# Patient Record
Sex: MALE | Race: WHITE | NOT HISPANIC OR LATINO | URBAN - METROPOLITAN AREA
[De-identification: names, ages, dates, MRNs, and addresses within clinical notes are randomized per-mention and may not be internally consistent; named-entity substitution may affect disease eponyms.]

---

## 2017-04-03 ENCOUNTER — OUTPATIENT (OUTPATIENT)
Dept: OUTPATIENT SERVICES | Age: 22
LOS: 1 days | Discharge: HOME | End: 2017-04-03

## 2017-04-04 ENCOUNTER — APPOINTMENT (OUTPATIENT)
Dept: PEDIATRIC CARDIOLOGY | Facility: CLINIC | Age: 22
End: 2017-04-04

## 2017-04-25 ENCOUNTER — APPOINTMENT (OUTPATIENT)
Dept: PEDIATRIC CARDIOLOGY | Facility: CLINIC | Age: 22
End: 2017-04-25

## 2017-04-25 VITALS
WEIGHT: 127.87 LBS | HEIGHT: 64.96 IN | HEART RATE: 62 BPM | BODY MASS INDEX: 21.3 KG/M2 | SYSTOLIC BLOOD PRESSURE: 117 MMHG | OXYGEN SATURATION: 100 % | DIASTOLIC BLOOD PRESSURE: 69 MMHG

## 2018-04-09 ENCOUNTER — OUTPATIENT (OUTPATIENT)
Dept: OUTPATIENT SERVICES | Age: 23
LOS: 1 days | Discharge: HOME | End: 2018-04-09

## 2018-04-10 ENCOUNTER — APPOINTMENT (OUTPATIENT)
Dept: PEDIATRIC CARDIOLOGY | Facility: CLINIC | Age: 23
End: 2018-04-10
Payer: COMMERCIAL

## 2018-04-10 VITALS
HEART RATE: 68 BPM | SYSTOLIC BLOOD PRESSURE: 125 MMHG | HEIGHT: 64.76 IN | DIASTOLIC BLOOD PRESSURE: 71 MMHG | BODY MASS INDEX: 21.75 KG/M2 | WEIGHT: 128.97 LBS | OXYGEN SATURATION: 100 %

## 2018-04-10 PROCEDURE — 93325 DOPPLER ECHO COLOR FLOW MAPG: CPT

## 2018-04-10 PROCEDURE — 93303 ECHO TRANSTHORACIC: CPT

## 2018-04-10 PROCEDURE — 93000 ELECTROCARDIOGRAM COMPLETE: CPT

## 2018-04-10 PROCEDURE — 99214 OFFICE O/P EST MOD 30 MIN: CPT | Mod: 25

## 2018-04-10 PROCEDURE — 93320 DOPPLER ECHO COMPLETE: CPT

## 2018-04-10 RX ORDER — ESCITALOPRAM OXALATE 10 MG/1
10 TABLET ORAL
Qty: 30 | Refills: 0 | Status: DISCONTINUED | COMMUNITY
Start: 2018-03-26

## 2018-04-11 ENCOUNTER — MEDICATION RENEWAL (OUTPATIENT)
Age: 23
End: 2018-04-11

## 2018-12-03 ENCOUNTER — APPOINTMENT (OUTPATIENT)
Dept: PEDIATRIC CARDIOLOGY | Facility: CLINIC | Age: 23
End: 2018-12-03

## 2018-12-04 ENCOUNTER — RESULT CHARGE (OUTPATIENT)
Age: 23
End: 2018-12-04

## 2018-12-06 ENCOUNTER — OTHER (OUTPATIENT)
Age: 23
End: 2018-12-06

## 2018-12-06 ENCOUNTER — APPOINTMENT (OUTPATIENT)
Dept: PULMONOLOGY | Facility: CLINIC | Age: 23
End: 2018-12-06
Payer: COMMERCIAL

## 2018-12-06 ENCOUNTER — APPOINTMENT (OUTPATIENT)
Dept: PEDIATRIC CARDIOLOGY | Facility: CLINIC | Age: 23
End: 2018-12-06
Payer: COMMERCIAL

## 2018-12-06 ENCOUNTER — APPOINTMENT (OUTPATIENT)
Dept: PEDIATRIC CARDIOLOGY | Facility: CLINIC | Age: 23
End: 2018-12-06

## 2018-12-06 VITALS
HEART RATE: 51 BPM | TEMPERATURE: 98.4 F | OXYGEN SATURATION: 95 % | WEIGHT: 128 LBS | HEIGHT: 64 IN | BODY MASS INDEX: 21.85 KG/M2

## 2018-12-06 VITALS
TEMPERATURE: 97.4 F | WEIGHT: 128 LBS | BODY MASS INDEX: 21.85 KG/M2 | OXYGEN SATURATION: 100 % | HEIGHT: 64 IN | SYSTOLIC BLOOD PRESSURE: 131 MMHG | DIASTOLIC BLOOD PRESSURE: 68 MMHG | HEART RATE: 59 BPM

## 2018-12-06 DIAGNOSIS — Z86.59 PERSONAL HISTORY OF OTHER MENTAL AND BEHAVIORAL DISORDERS: ICD-10-CM

## 2018-12-06 DIAGNOSIS — Q67.7 PECTUS CARINATUM: ICD-10-CM

## 2018-12-06 DIAGNOSIS — R07.89 OTHER CHEST PAIN: ICD-10-CM

## 2018-12-06 DIAGNOSIS — Z51.81 ENCOUNTER FOR THERAPEUTIC DRUG LVL MONITORING: ICD-10-CM

## 2018-12-06 DIAGNOSIS — Z79.899 ENCOUNTER FOR THERAPEUTIC DRUG LVL MONITORING: ICD-10-CM

## 2018-12-06 DIAGNOSIS — R06.09 OTHER FORMS OF DYSPNEA: ICD-10-CM

## 2018-12-06 DIAGNOSIS — Q87.89 OTHER SPECIFIED CONGENITAL MALFORMATION SYNDROMES, NOT ELSEWHERE CLASSIFIED: ICD-10-CM

## 2018-12-06 DIAGNOSIS — Z87.898 PERSONAL HISTORY OF OTHER SPECIFIED CONDITIONS: ICD-10-CM

## 2018-12-06 PROCEDURE — 93000 ELECTROCARDIOGRAM COMPLETE: CPT

## 2018-12-06 PROCEDURE — 99205 OFFICE O/P NEW HI 60 MIN: CPT | Mod: 25

## 2018-12-06 PROCEDURE — 99204 OFFICE O/P NEW MOD 45 MIN: CPT

## 2018-12-06 RX ORDER — ESCITALOPRAM OXALATE 10 MG/1
10 TABLET, FILM COATED ORAL
Refills: 0 | Status: DISCONTINUED | COMMUNITY
Start: 2018-04-10 | End: 2018-12-06

## 2018-12-06 RX ORDER — ATOMOXETINE HYDROCHLORIDE 60 MG/1
60 CAPSULE ORAL
Refills: 0 | Status: DISCONTINUED | COMMUNITY
End: 2018-12-06

## 2018-12-06 NOTE — PHYSICAL EXAM
[General Appearance - Alert] : alert [General Appearance - In No Acute Distress] : in no acute distress [General Appearance - Well Nourished] : well nourished [General Appearance - Well Developed] : well developed [General Appearance - Well-Appearing] : well appearing [Other ___] : [unfilled] [Sclera] : the conjunctiva were normal [Examination Of The Oral Cavity] : mucous membranes were moist and pink [Auscultation Breath Sounds / Voice Sounds] : breath sounds clear to auscultation bilaterally [Pectus Excavatum] : a pectus excavatum was noted [Heart Rate And Rhythm] : normal heart rate and rhythm [Heart Sounds] : normal S1 and S2 [Hyperdynamic] : There was a hyperdynamic precordium [2+] : left 2+ [Systolic] : systolic [III] : a grade 3/6   [Apical] : apex [Holosystolic] : holosystolic [Base] : the murmur was transmitted to the base [Abdomen Soft] : soft [Nondistended] : nondistended [Nail Clubbing] : no clubbing  or cyanosis of the fingers [Musculoskeletal Exam: Normal Movement Of All Extremities] : normal movements of all extremities [Musculoskeletal - Swelling] : no joint swelling or joint tenderness [Motor Tone] : muscle strength and tone were normal [Abnormal Walk] : normal gait [] : no rash [Skin Lesions] : no lesions [Skin Turgor] : normal turgor [Demonstrated Behavior - Infant Nonreactive To Parents] : interactive [Mood] : mood and affect were appropriate for age [Demonstrated Behavior] : normal behavior

## 2018-12-12 ENCOUNTER — OUTPATIENT (OUTPATIENT)
Dept: OUTPATIENT SERVICES | Facility: HOSPITAL | Age: 23
LOS: 1 days | End: 2018-12-12
Payer: COMMERCIAL

## 2018-12-12 ENCOUNTER — APPOINTMENT (OUTPATIENT)
Dept: SLEEP CENTER | Facility: HOME HEALTH | Age: 23
End: 2018-12-12
Payer: COMMERCIAL

## 2018-12-12 PROCEDURE — 95800 SLP STDY UNATTENDED: CPT

## 2018-12-12 PROCEDURE — 95800 SLP STDY UNATTENDED: CPT | Mod: 26

## 2018-12-20 PROBLEM — Z87.898 HISTORY OF CHRONIC FATIGUE: Status: RESOLVED | Noted: 2018-12-06 | Resolved: 2018-12-20

## 2018-12-20 PROBLEM — Z51.81 ENCOUNTER FOR MONITORING BETA BLOCKER THERAPY: Status: RESOLVED | Noted: 2018-04-04 | Resolved: 2018-12-20

## 2018-12-20 PROBLEM — R06.09 DYSPNEA ON EXERTION: Status: RESOLVED | Noted: 2018-12-06 | Resolved: 2018-12-20

## 2018-12-20 NOTE — REASON FOR VISIT
[Initial Consultation] : an initial consultation for [Hypertrophic Cardiomyopathy] : hypertrophic cardiomyopathy [Patient] : patient [Parents] : parents [FreeTextEntry3] : C

## 2018-12-20 NOTE — DISCUSSION/SUMMARY
[FreeTextEntry1] : In summary Alexsander is a 22 year old male with CFC and hypertrophic cardiomyopathy (HCM). Since this was our first meeting with him we took some time to evaluate both his and his families understanding of his heart condition and the long term prognosis of his disease. We discussed that patients with HCM are at risk for sudden cardiac death. Alexsander has a intraventricular septal thickness of 2.9 cm, a history of non sustained ventricular tachycardia and dilated right and left atria on echo. Based on these findings, he does not yet meet criteria for ICD implant but does warrant close follow up and attention to any new onset symptoms. \par \par We spent a significant amount of time discussing these findings with the family and reviewing the following plan which includes work up of his daytime fatigue to rule out a primary sleep disorder or fatigue related to beta blocker therapy.\par 1. Pulmonary evaluation w/ sleep study\par 2. Repeat exercise stress test, if test is abnormal will also repeat Holter monitor\par 3. follow up in 6 months with echo cardiogram and Holter monitor\par \par We remain available to the family to answer questions and will continue to update them with results of testing as they become available. [Needs SBE Prophylaxis] : [unfilled] does not need bacterial endocarditis prophylaxis

## 2018-12-20 NOTE — REVIEW OF SYSTEMS
[Exercise Intolerance] : persistence of exercise intolerance [Shortness Of Breath] : expressed as feeling short of breath [Feeling Poorly] : not feeling poorly (malaise) [Fever] : no fever [Wgt Loss (___ Lbs)] : no recent weight loss [Pallor] : not pale [Nasal Stuffiness] : no nasal congestion [Sore Throat] : no sore throat [Cyanosis] : no cyanosis [Edema] : no edema [Diaphoresis] : not diaphoretic [Chest Pain] : no chest pain or discomfort [Palpitations] : no palpitations [Orthopnea] : no orthopnea [Fast HR] : no tachycardia [Tachypnea] : not tachypneic [Wheezing] : no wheezing [Cough] : no cough [Vomiting] : no vomiting [Diarrhea] : no diarrhea [Abdominal Pain] : no abdominal pain [Decrease In Appetite] : appetite not decreased [Fainting (Syncope)] : no fainting [Seizure] : no seizures [Headache] : no headache [Dizziness] : no dizziness [Limping] : no limping [Joint Pains] : no arthralgias [Rash] : no rash [Easy Bruising] : no tendency for easy bruising [Swollen Glands] : no lymphadenopathy [Sleep Disturbances] : ~T no sleep disturbances [Depression] : no depression [Anxiety] : no anxiety [Heat/Cold Intolerance] : no temperature intolerance

## 2018-12-20 NOTE — HISTORY OF PRESENT ILLNESS
[FreeTextEntry1] : We had the pleasure of seeing CARMEN JAMESON for evaluation today in the Adult Cong[facility] Program. This is his first visit here with us today. He has previously been followed by Dr. Diaz in the context of Cardiofaciocutaneous Syndrome and HCM. 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 He does see a dentist annually but does not receive an annual flu shot. he drinks social alcohol but denies tobacco or recreational drugs. He does not participate in any structured aerobic activity. He is on atenolol for non sustained VT on a Holter in 2017.\par \par His does report some shortness of breath with exertion and daytime fatigue. Otherwise he denies chest pain, palpitations, lightheadedness or syncope. He is accompanied to today's visit by his parents.\par

## 2018-12-20 NOTE — CONSULT LETTER
[Today's Date] : [unfilled] [Name] : Name: [unfilled] [] : : ~~ [Today's Date:] : [unfilled] [Dear  ___:] : Dear Dr. [unfilled]: [Consult - Multiple Provider] : Thank you very much for allowing us to participate in the care of this patient. If you have any questions, please do not hesitate to contact us. [Sincerely,] : Sincerely, [DrSven  ___] : Dr. LAIRD [FreeTextEntry4] : Dr. Lovell [FreeTextEntry5] : [address] [FreeTextEntry6] : [place] [address] [de-identified] : \par Virginie Flores, MSN, CPNP-AC, PC\par Pediatric Cardiology, Adult Congenital Cardiology\par [facility]\par

## 2018-12-20 NOTE — CARDIOLOGY SUMMARY
[Today's Date] : [unfilled] [FreeTextEntry1] : NSR, ventricular rate 70 bpm. Biatrial enlargement, right superior axis deviation, severe LVH

## 2018-12-21 DIAGNOSIS — G47.33 OBSTRUCTIVE SLEEP APNEA (ADULT) (PEDIATRIC): ICD-10-CM

## 2019-03-07 ENCOUNTER — APPOINTMENT (OUTPATIENT)
Dept: PEDIATRIC CARDIOLOGY | Facility: CLINIC | Age: 24
End: 2019-03-07
Payer: COMMERCIAL

## 2019-03-07 PROCEDURE — 93015 CV STRESS TEST SUPVJ I&R: CPT

## 2019-03-07 PROCEDURE — 94681 O2 UPTK CO2 OUTP % O2 XTRC: CPT

## 2019-03-07 PROCEDURE — 94010 BREATHING CAPACITY TEST: CPT

## 2019-03-08 ENCOUNTER — RX RENEWAL (OUTPATIENT)
Age: 24
End: 2019-03-08

## 2019-03-22 ENCOUNTER — RX RENEWAL (OUTPATIENT)
Age: 24
End: 2019-03-22

## 2019-06-06 ENCOUNTER — APPOINTMENT (OUTPATIENT)
Dept: PEDIATRIC CARDIOLOGY | Facility: CLINIC | Age: 24
End: 2019-06-06
Payer: COMMERCIAL

## 2019-06-06 VITALS
HEIGHT: 64.57 IN | SYSTOLIC BLOOD PRESSURE: 116 MMHG | DIASTOLIC BLOOD PRESSURE: 68 MMHG | WEIGHT: 132.25 LBS | BODY MASS INDEX: 22.3 KG/M2 | TEMPERATURE: 97.3 F | HEART RATE: 62 BPM | OXYGEN SATURATION: 97 %

## 2019-06-06 PROCEDURE — 93000 ELECTROCARDIOGRAM COMPLETE: CPT

## 2019-06-06 PROCEDURE — 93320 DOPPLER ECHO COMPLETE: CPT

## 2019-06-06 PROCEDURE — 93303 ECHO TRANSTHORACIC: CPT

## 2019-06-06 PROCEDURE — 93325 DOPPLER ECHO COLOR FLOW MAPG: CPT

## 2019-06-06 PROCEDURE — 99215 OFFICE O/P EST HI 40 MIN: CPT | Mod: 25

## 2019-06-07 NOTE — PHYSICAL EXAM
[General Appearance - Well Nourished] : well nourished [General Appearance - In No Acute Distress] : in no acute distress [General Appearance - Alert] : alert [General Appearance - Well-Appearing] : well appearing [General Appearance - Well Developed] : well developed [Attitude Uncooperative] : cooperative [Appearance Of Head] : the head was normocephalic [Sclera] : the conjunctiva were normal [Facies] : there were no dysmorphic facial features [EOMI] : ~T the extraocular movements were intact [PERRL With Normal Accommodation] : the pupils were equal in size, round, and reactive to light [Outer Ear] : the ears and nose were normal in appearance [Examination Of The Oral Cavity] : mucous membranes were moist and pink [Nasal Cavity] : the nasal mucosa was normal [Oropharynx] : the oropharynx was normal [Respiration, Rhythm And Depth] : normal respiratory rhythm and effort [Auscultation Breath Sounds / Voice Sounds] : breath sounds clear to auscultation bilaterally [Stridor] : no stridor was observed [No Cough] : no cough [Chest Palpation Tender Sternum] : no chest wall tenderness [No Sternal Instability] : no sternal instability [Pectus Excavatum] : a pectus excavatum was noted [Heart Sounds Gallop] : no gallops [Heart Sounds] : normal S1 and S2 [Heart Sounds Pericardial Friction Rub] : no pericardial rub [Heart Sounds Click] : no clicks [Arterial Pulses] : normal upper and lower extremity pulses with no pulse delay [Capillary Refill Test] : normal capillary refill [Edema] : no edema [Hyperdynamic] : There was a hyperdynamic precordium [Displaced to Right] : displaced to the right [Systolic] : systolic [Tachycardic ___] : the heart rate was tachycardic at [unfilled] bpm [Ejection] : ejection [LMSB] : LMSB  [III] : a grade 3/6   [Harsh] : harsh [Early] : early [High] : high pitched [Carotids] : the murmur was transmitted to the carotid arteries [No Diastolic Murmur] : no diastolic murmur was heard [Bowel Sounds] : normal bowel sounds [Nondistended] : nondistended [Abdomen Soft] : soft [Nail Clubbing] : no clubbing  or cyanosis of the fingernails [Abdomen Tenderness] : non-tender [Musculoskeletal - Tenderness] : no joint tenderness was elicited [Musculoskeletal - Swelling] : no joint swelling seen [Musculoskeletal Exam: Normal Movement Of All Extremities] : normal movements of all extremities [Abnormal Walk] : normal gait [Motor Tone] : muscle strength and tone were normal [] : no rash [Cervical Lymph Nodes Enlarged Anterior] : The anterior cervical nodes were normal [Cervical Lymph Nodes Enlarged Posterior] : The posterior cervical nodes were normal [Skin Turgor] : normal turgor [Skin Lesions] : no lesions [Skin Color & Pigmentation] : normal skin color and pigmentation [Demonstrated Behavior - Infant Nonreactive To Parents] : interactive [Scar] : no scars [Mood] : mood and affect were appropriate for age [Demonstrated Behavior] : normal behavior

## 2019-06-07 NOTE — DISCUSSION/SUMMARY
[Needs SBE Prophylaxis] : [unfilled]  needs bacterial endocarditis prophylaxis. SBE prophylaxis is indicated for dental and invasive ENT procedures. (Circulation. 2007; 116: 4131-8203) [Influenza vaccine is recommended] : Influenza vaccine is recommended [Participate only in Mild PE activities] : [unfilled] may participate ONLY IN MILD physical education activities such as [place] games, golf, and badminton. [FreeTextEntry1] : Alexsander is a 23 year old young man with severe HCM.  At the present time, his only symptoms are dyspnea on exertion.  Importantly, this demonstrates no change over the past few years.  He is planning to go to a summer camp soon and do odd jobs.  I told him that he should be very careful to not lift any significant weight (nothing more than about 25-30 pounds).  I explained the issue of increasing his afterload and making it more difficult for blood to eject from his ventricle that might result in him passing out.  I told him that, pending any abnormalities in his Holter exam, his condition is stable.but that any symptoms of a sustained rapid heart beat, dizziness, syncope, chest pain should stimulate him to call us for an urgent appointment.  He should make very sure to make sure he stays well hydrated as hypovolemia might cause an increase in his LVOT gradient and unmask symptoms.\par \par Given his lack of symptoms, no family history of the disease and no history in the family of sudden death, he does not meet criteria for any intervention at this time.  His echocardiogram is essentially unchanged at this time as well.\par \par I would like to see him every six months as he is a slightly difficult interview in terms of getting him to let me know if something has changed.  I also believe that having a relatively close physician-patient relationship, especially in this case, requires this type of follow-up.  I do not expect to repeat his echo at that time but to make sure he tolerated the heat of summer and his plans for the rest of the year.  I also told him that he really needs to have his teeth cleaned every 6 months and that he should see his PCP for routine, annual evaluation and labs.  I also reiterated that he really needs to get an annual flu shot.

## 2019-06-07 NOTE — HISTORY OF PRESENT ILLNESS
[FreeTextEntry1] : We had the pleasure of seeing CARMEN JAMESON for evaluation today in the Adult Congenital Heart Program at our [place] location at [address] near [address] [place] [address] in [place].. This is his first followup with me today. He has previously been followed by Dr. Diaz in the context of Cardiofaciocutaneous Syndrome and HCM. 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 He does see a dentist annually but does not receive an annual flu shot. he drinks social alcohol but denies tobacco or recreational drugs. He does not participate in any structured aerobic activity. He is on atenolol for non sustained VT on a Holter in 2017.\par \par Following last year's visit, I had him see Dr. Jameson for daytime drowsiness and they performed a home sleep study that was normal (no sleep apnea).  He was taking Stratera and Lexapro when I saw him last year but has discontinued both awhile ago.  Discussing this further, Carmen noted that his daytime drowsiness is likely due to his very variable sleep times (stays up late and sleeps in).\par \par His does report some shortness of breath with exertion and daytime fatigue. Otherwise he denies chest pain, palpitations, lightheadedness or syncope. He is accompanied to today's visit by his parents.\par

## 2019-06-07 NOTE — REVIEW OF SYSTEMS
[Nl] : Endocrine [Exercise Intolerance] : persistence of exercise intolerance [SOB With Exertion] : shortness of breath during exertion

## 2019-06-07 NOTE — CARDIOLOGY SUMMARY
[de-identified] : 6/6/2019 [FreeTextEntry1] : NOTE:  ECG is at 1/2 standard:\par Sinus bradycardia\par Bi-atrial enlargement\par Pseudo-infarct pattern in inferior limb and chest leads is indicative of sever Biventricular hypertrophy\par No change from study of 6-Dec-2018 [de-identified] : 6/6/2019 [FreeTextEntry2] : Mildly dilated left atrium\par Obstructive hypertrophic cardiomyopathy\par Left ventricular dynamic obstruction\par Max diameter of IV septum is 20 mm\par Peak LVOT gradient is 76 mmHg\par The mitral valve is thickened with redundant tissue on the anterior mitral valve leaflet and mild prolapse. [de-identified] : Pending at this time (6/6/2019) [de-identified] : 6/6/2019

## 2019-07-01 PROBLEM — R07.89 CHEST TIGHTNESS OR PRESSURE: Status: RESOLVED | Noted: 2017-04-25 | Resolved: 2019-07-01

## 2019-11-29 ENCOUNTER — OUTPATIENT (OUTPATIENT)
Dept: OUTPATIENT SERVICES | Age: 24
LOS: 1 days | Discharge: HOME | End: 2019-11-29

## 2019-12-02 ENCOUNTER — APPOINTMENT (OUTPATIENT)
Dept: PEDIATRIC CARDIOLOGY | Facility: CLINIC | Age: 24
End: 2019-12-02
Payer: COMMERCIAL

## 2019-12-02 VITALS
SYSTOLIC BLOOD PRESSURE: 112 MMHG | WEIGHT: 136.69 LBS | RESPIRATION RATE: 22 BRPM | BODY MASS INDEX: 23.05 KG/M2 | HEIGHT: 64.57 IN | DIASTOLIC BLOOD PRESSURE: 70 MMHG | HEART RATE: 58 BPM | OXYGEN SATURATION: 98 %

## 2019-12-02 PROCEDURE — 93000 ELECTROCARDIOGRAM COMPLETE: CPT

## 2019-12-02 PROCEDURE — 99214 OFFICE O/P EST MOD 30 MIN: CPT | Mod: 25

## 2019-12-05 NOTE — PHYSICAL EXAM
[General Appearance - Alert] : alert [Facies] : the head and face were normal in appearance [Appearance Of Head] : the head was normocephalic [Respiration, Rhythm And Depth] : normal respiratory rhythm and effort [Examination Of The Oral Cavity] : mucous membranes were moist and pink [Sclera] : the conjunctiva were normal [Auscultation Breath Sounds / Voice Sounds] : breath sounds clear to auscultation bilaterally [Normal Chest Appearance] : the chest was normal in appearance [Pectus Excavatum] : a pectus excavatum was noted [Heart Sounds] : normal S1 and S2 [Heart Rate And Rhythm] : normal heart rate and rhythm [Displaced to Left] : displaced to the left [Systolic] : systolic [IV] : a grade 4/6   [Apical] : apex [Nondistended] : nondistended [Nail Clubbing] : no clubbing  or cyanosis of the fingers [Musculoskeletal - Swelling] : no joint swelling or joint tenderness [Musculoskeletal Exam: Normal Movement Of All Extremities] : normal movements of all extremities [Abnormal Walk] : normal gait [Demonstrated Behavior - Infant Nonreactive To Parents] : interactive [Skin Turgor] : normal turgor [Mood] : mood and affect were appropriate for age [General Appearance - In No Acute Distress] : in no acute distress [General Appearance - Well Nourished] : well nourished [General Appearance - Well-Appearing] : well appearing [General Appearance - Well Developed] : well developed [Attitude Uncooperative] : cooperative [Outer Ear] : the ears and nose were normal in appearance [Nasal Cavity] : the nasal mucosa was normal [Stridor] : no stridor was observed [No Cough] : no cough [Dankcendo] : daniel [High] : high pitched [Harsh] : harsh [FreeTextEntry1] : active precordium, thrill palpated at LSB [Early] : early [Bowel Sounds] : normal bowel sounds [Abdomen Soft] : soft [Abdomen Tenderness] : non-tender [Cervical Lymph Nodes Enlarged Anterior] : The anterior cervical nodes were normal [Cervical Lymph Nodes Enlarged Posterior] : The posterior cervical nodes were normal [Skin Lesions] : no lesions [] : no rash [Skin Color & Pigmentation] : normal skin color and pigmentation

## 2019-12-05 NOTE — HISTORY OF PRESENT ILLNESS
[FreeTextEntry1] : Alexsander Moore was seen today for follow up in the Adult Congenital Heart Program of the [facility]. As you know, Alexsander is a 23 year old male with Cardiofaciocutaneous syndrome and hypertrophic cardiomyopathy. He has undergone previous genetic testing which has failed to identify a mutation responsible for his HCM. Alexsander still lives at home but is recently engaged and will be getting  in March. 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  He has not gotten a flu shot and cannot remember the date of his last dental exam. He remains on Atenolol for non sustained VT on a Holter in 2017. He admits to occasionally missing doses.\par \par He continues to report shortness of breath with exertion but denies chest pain, palpitations, dizziness or syncope.

## 2019-12-05 NOTE — CARDIOLOGY SUMMARY
[Today's Date] : [unfilled] [FreeTextEntry1] : Sinus bradycardia, ventricular rate 52 bpm, biatrial enlargement, right superior axis deviation, biventricular hypertrophy

## 2019-12-05 NOTE — REVIEW OF SYSTEMS
[Change in Vision] : change in vision [Shortness Of Breath] : expressed as feeling short of breath [Sleep Disturbances] : ~T sleep disturbances [Anxiety] : anxiety [Fever] : no fever [Feeling Poorly] : not feeling poorly (malaise) [Pallor] : not pale [Wgt Loss (___ Lbs)] : no recent weight loss [Redness] : no redness [Eye Discharge] : no eye discharge [Nasal Stuffiness] : no nasal congestion [Sore Throat] : no sore throat [Loss Of Hearing] : no hearing loss [Earache] : no earache [Cyanosis] : no cyanosis [Edema] : no edema [Diaphoresis] : not diaphoretic [Orthopnea] : no orthopnea [Chest Pain] : no chest pain or discomfort [Fast HR] : no tachycardia [Tachypnea] : not tachypneic [Wheezing] : no wheezing [Cough] : no cough [Vomiting] : no vomiting [Diarrhea] : no diarrhea [Abdominal Pain] : no abdominal pain [Decrease In Appetite] : appetite not decreased [Seizure] : no seizures [Fainting (Syncope)] : no fainting [Headache] : no headache [Dizziness] : no dizziness [Joint Pains] : no arthralgias [Limping] : no limping [Wound problems] : no wound problems [Joint Swelling] : no joint swelling [Easy Bruising] : no tendency for easy bruising [Swollen Glands] : no lymphadenopathy [Easy Bleeding] : no ~M tendency for easy bleeding [Nosebleeds] : no epistaxis [Hyperactive] : no hyperactive behavior [Depression] : no depression [Heat/Cold Intolerance] : no temperature intolerance [Dec Urine Output] : no oliguria

## 2019-12-05 NOTE — CONSULT LETTER
[Today's Date] : [unfilled] [Name] : Name: [unfilled] [] : : ~~ [Today's Date:] : [unfilled] [Dear  ___:] : Dear Dr. [unfilled]: [Consult] : I had the pleasure of evaluating your patient, [unfilled]. My full evaluation follows. [Sincerely,] : Sincerely, [Consult - Multiple Provider] : Thank you very much for allowing us to participate in the care of this patient. If you have any questions, please do not hesitate to contact us. [FreeTextEntry4] :  [FreeTextEntry6] : [place] [address] [FreeTextEntry7] : [phone] [FreeTextEntry5] : [address] [de-identified] : Virginie Flores, MSN, CPNP-AC, PC\par Pediatric Cardiology, Adult Congenital Cardiology\par [facility]\par \par Harvey Marcos MD, NHI\par Medical Director, Adult Congenital Heart Program\par Professor of Pediatrics\par [employer] [facility] [employer] [facility]\par

## 2019-12-05 NOTE — DISCUSSION/SUMMARY
[May participate in all age-appropriate activities] : [unfilled] May participate in all age-appropriate activities. [Influenza vaccine is recommended] : Influenza vaccine is recommended [Needs SBE Prophylaxis] : [unfilled] does not need bacterial endocarditis prophylaxis [FreeTextEntry1] : In summary, Alexsander is a 23 year old male with CFC and HCM. He reports no change in symptoms and we deferred an echocardiogram at this visit today (unlikely that it has changed over the last 6 months). His ECG is unchanged. We did place a Holter today to identify if he may be having any arrhythmia despite his lack of symptoms. We educated him on the symptoms that needed to be reported to us should he experience them such as lightheadedness and syncope. We reviewed the importance of meticulous dental hygiene and the need for regular dental cleanings. He needs to stay well hydrated and we continue to educate him regarding his cardiac condition. We will have him follow up again in 6 months or sooner if clinically indicated.\par \par \par \par \par

## 2020-05-20 ENCOUNTER — APPOINTMENT (OUTPATIENT)
Dept: PEDIATRIC CARDIOLOGY | Facility: CLINIC | Age: 25
End: 2020-05-20
Payer: COMMERCIAL

## 2020-05-20 PROCEDURE — 99205 OFFICE O/P NEW HI 60 MIN: CPT

## 2020-05-26 NOTE — DISCUSSION/SUMMARY
[FreeTextEntry1] : This visit was prompted by Alexsander and his father, who accompanied him today, to discuss erectile dysfunction and what therapy would be safe for him given his fixed resting LVOT obstruction os just over 75 mm Hg.  He was recently  and there is a desire to start a family immediately.  I had recommended him to a urologist who prescribed a PDE5-inhibitor (Viagra) for this condition.  Before starting the medication he was advised to speak with me over the phone, which he did and prompted this enhanced discussion.\par I explained that the systemic vasodilatory effect of the medication when given orally could result in further LVOT obstruction (fixed proximal obstruction in the face of distal vasodilation) and has been reported in a small number of patients to induce syncope and even Sudden death.  Given that he has non-sustained VT and SVT, the beta-blocker I have prescribed for him and is managed his arrhythmias, could cause erectile dysfunction.  Thus there are three scenarios I offered him:\par 1.  Come to the office for a BP and Echo measurement of his LVOT gradient at rest.  Take a Viagra tablet and after 30-45 minutes, re-check his BP and echo gradients.  \par 2.  Come off the beta-blocker to see if the ED goes away.  I asked him if he had ED prior to being on the beta-blocker and he said he could not remember.\par 3 To avoid systemic effects, he could inject Viagra directly into the penis to cause local vasodilation.  He rejected that as well.\par \par Alternatively, I said he could consider sperm donation on his part with artificial insemination of his wife.  At that point in time, the father interjected that the couple would have to undergo IVF anyway.  I thought this was odd and asked why that was the case.  Dad said the wife had "some sort of condition".  In trying to discover what that was, the father spoke with his wife and asked her to text him the wife's "condition".  As it turns out, the wife has Haque's Syndrome.\par \par I was obviously taken aback at that point in time as Haque's Syndrome results in infertility, even in so-called "mosaics" as they are essentially XO rather than XX.  Instead of continuing the conversation, which had gone on for about an hour, I decided to suspend the discussion and told them I would get back to them the following week. Given they obviously had no clue about the effect of Haque's Syndrome, the need for immediate attention to the ED issue was moot.\par \par I plan to speak with them on the phone and discuss the issue of Haque's and their understanding of this before giving them a formal recommendation.

## 2020-05-26 NOTE — HISTORY OF PRESENT ILLNESS
[FreeTextEntry1] : Alexsander Moore was seen today with his father in the Adult Congenital Heart Program at [facility]. Alexsander is a  24 year old male with Cardiofaciocutaneous syndrome and hypertrophic cardiomyopathy. his genetic testing has been negative but he has all the phenotypic characteristics of obstructive hypertrophic cardiomyopathy. He also has a history of NSVT and remains on Atenolol. He is newly  and has called with concerns regarding erectile dysfunction. He recently saw a  urologist who prescribed SIldenafil. He is here to discuss the implications of the medication in relation to his HCM.

## 2020-05-26 NOTE — CONSULT LETTER
[Today's Date] : [unfilled] [Name] : Name: [unfilled] [] : : ~~ [Today's Date:] : [unfilled] [Dear  ___:] : Dear Dr. [unfilled]: [Consult] : I had the pleasure of evaluating your patient, [unfilled]. My full evaluation follows. [Consult - Multiple Provider] : Thank you very much for allowing us to participate in the care of this patient. If you have any questions, please do not hesitate to contact us. [Sincerely,] : Sincerely, [DrSven  ___] : Dr. LAIRD [FreeTextEntry4] :  [FreeTextEntry5] : [address] [FreeTextEntry6] : [place] [address] [FreeTextEntry7] : [phone] [de-identified] : Virginie Flores, MSN, CPNP-AC, PC\par Pediatric Cardiology, Adult Congenital Cardiology\par [facility]\par \par Harvey Marcos MD, NHI\par Medical Director, Adult Congenital Heart Program\par Professor of Pediatrics\par [employer] [facility] [employer] at [facility]\par

## 2020-06-29 ENCOUNTER — APPOINTMENT (OUTPATIENT)
Dept: PEDIATRIC CARDIOLOGY | Facility: CLINIC | Age: 25
End: 2020-06-29
Payer: COMMERCIAL

## 2020-06-29 ENCOUNTER — APPOINTMENT (OUTPATIENT)
Dept: PEDIATRIC CARDIOLOGY | Facility: CLINIC | Age: 25
End: 2020-06-29

## 2020-06-29 VITALS
SYSTOLIC BLOOD PRESSURE: 120 MMHG | OXYGEN SATURATION: 98 % | WEIGHT: 139.55 LBS | HEIGHT: 64.57 IN | RESPIRATION RATE: 18 BRPM | HEART RATE: 54 BPM | BODY MASS INDEX: 23.54 KG/M2 | DIASTOLIC BLOOD PRESSURE: 75 MMHG

## 2020-06-29 PROCEDURE — 99214 OFFICE O/P EST MOD 30 MIN: CPT

## 2020-06-29 PROCEDURE — 93325 DOPPLER ECHO COLOR FLOW MAPG: CPT

## 2020-06-29 PROCEDURE — 93303 ECHO TRANSTHORACIC: CPT

## 2020-06-29 PROCEDURE — 93320 DOPPLER ECHO COMPLETE: CPT

## 2020-06-29 NOTE — PHYSICAL EXAM
[General Appearance - Alert] : alert [Facies] : the head and face were normal in appearance [Sclera] : the conjunctiva were normal [Auscultation Breath Sounds / Voice Sounds] : breath sounds clear to auscultation bilaterally [Normal Chest Appearance] : the chest was normal in appearance [Examination Of The Oral Cavity] : mucous membranes were moist and pink [Heart Sounds] : normal S1 and S2 [Displaced to Right] : displaced to the right [Normal] : the heart rate was normal [Hyperdynamic] : There was a hyperdynamic precordium [III] : a grade 3/6   [Systolic] : systolic [LMSB] : LMSB  [Ejection] : ejection [High] : high pitched [Harsh] : harsh [Carotids] : the murmur was transmitted to the carotid arteries [Early] : early [Nondistended] : nondistended [Nail Clubbing] : no clubbing  or cyanosis of the fingers [] : no hepato-splenomegaly [Musculoskeletal - Swelling] : no joint swelling or joint tenderness [Musculoskeletal Exam: Normal Movement Of All Extremities] : normal movements of all extremities [Skin Turgor] : normal turgor [Abnormal Walk] : normal gait [Demonstrated Behavior - Infant Nonreactive To Parents] : interactive [Mood] : mood and affect were appropriate for age [Demonstrated Behavior] : normal behavior

## 2020-06-29 NOTE — REASON FOR VISIT
[Follow-Up] : a follow-up visit for [Hypertrophic Cardiomyopathy] : hypertrophic cardiomyopathy [Patient] : patient

## 2020-07-01 NOTE — REVIEW OF SYSTEMS
[Cyanosis] : no cyanosis [Feeling Poorly] : not feeling poorly (malaise) [Chest Pain] : no chest pain or discomfort [Edema] : no edema [Exercise Intolerance] : no persistence of exercise intolerance [Tachypnea] : not tachypneic [Palpitations] : no palpitations [Fast HR] : no tachycardia [Fainting (Syncope)] : no fainting [Shortness Of Breath] : not expressed as feeling short of breath [Decrease In Appetite] : appetite not decreased [Easy Bruising] : no tendency for easy bruising [Dizziness] : no dizziness [Easy Bleeding] : no ~M tendency for easy bleeding [Depression] : no depression [Sleep Disturbances] : ~T no sleep disturbances

## 2020-07-01 NOTE — CONSULT LETTER
[Today's Date] : [unfilled] [Name] : Name: [unfilled] [] : : ~~ [Dear  ___:] : Dear Dr. [unfilled]: [Today's Date:] : [unfilled] [Consult] : I had the pleasure of evaluating your patient, [unfilled]. My full evaluation follows. [Consult - Multiple Provider] : Thank you very much for allowing us to participate in the care of this patient. If you have any questions, please do not hesitate to contact us. [Sincerely,] : Sincerely, [DrSven  ___] : Dr. LAIRD [FreeTextEntry6] : [place] [address] [FreeTextEntry4] :  [FreeTextEntry5] : [address] [FreeTextEntry7] : [phone] [de-identified] : Virginie Flores, MSN, CPNP-AC, PC\par Pediatric Cardiology, Adult Congenital Cardiology\par [facility]der [facility]\par \par Harvey Marcos MD, NHI\par Medical Director, Adult Congenital Heart Program\par Professor of Pediatrics\par [employer] [facility] [employer] at [facility]\par

## 2020-07-01 NOTE — HISTORY OF PRESENT ILLNESS
[FreeTextEntry1] : Alexsander Moore was seen today in the Adult Congenital Heart Program at [facility].  Alexsander is a 24 year old male with Cardiofaciocutaneous syndrome and hypertrophic cardiomyopathy. His genetic testing has been negative but he has all the phenotypic characteristics of obstructive hypertrophic cardiomyopathy. He also has a history of NSVT and remains on Atenolol. He was  in March and continues to study at the [facility].  This visit is a follow up to his previous visit in regard treatment of his erectile dysfunction with a PDE5-inhibitor (Viagra). His resting gradients have measured 75 mm Hg. \par \par His cardiovascular review of systems is completely unremarkable today. Specifically, he  has no complaints of chest pain, palpitations, shortness of breath, peripheral edema, dizziness or syncope.\par

## 2020-07-01 NOTE — DISCUSSION/SUMMARY
[Influenza vaccine is recommended] : Influenza vaccine is recommended [FreeTextEntry1] : Alexsander underwent echocardiogram today pre-medication administration. His measured peak LVOT gradient was 55 mmHg at rest.  He then took 20mg of Sildenafil that was prescribed by his urologist. Approximately 45 minutes after administration of Sildenafil a repeat echo was performed with essentially no change in his LVOT gradient. \par \par In speaking with Alexsander, he continues to be completely asymptomatic.  I made sure he understood that the test we did today reassured me that AT REST, his LVOT gradient is unchanged.  Should he develop any palpitations or lightheadedness/syncope during sexual intercourse, we would like him to let us know ASAP so that we can reassess his status.  If this dose does not work for him, I told him to try 30 mg but that before advancing any further than that, I would want to re-test him at 40 mg here in the clinic again.\par I was careful to make sure that Alexsander understood the issue of using this medication which vasodilates his arteries and veins and has the potential to increase his gradient to unfavorable, or even dangerous, levels.  I was satisfied that he understood my instructions and that he should call an d let us know how the medication affected him\par \par Aside from his counseling regarding his medication today we will make no changes to his beta blocker therapy. It's probably reasonable to consider a cardiac MRI next year along with a stress test. We will see him in 9-12 months or sooner if clinically indicated. [Needs SBE Prophylaxis] : [unfilled] does not need bacterial endocarditis prophylaxis

## 2022-05-10 ENCOUNTER — APPOINTMENT (OUTPATIENT)
Dept: PEDIATRIC CARDIOLOGY | Facility: CLINIC | Age: 27
End: 2022-05-10
Payer: COMMERCIAL

## 2022-05-10 ENCOUNTER — INPATIENT (INPATIENT)
Facility: HOSPITAL | Age: 27
LOS: 0 days | Discharge: HOME | End: 2022-05-11
Attending: STUDENT IN AN ORGANIZED HEALTH CARE EDUCATION/TRAINING PROGRAM | Admitting: STUDENT IN AN ORGANIZED HEALTH CARE EDUCATION/TRAINING PROGRAM
Payer: COMMERCIAL

## 2022-05-10 VITALS
SYSTOLIC BLOOD PRESSURE: 121 MMHG | OXYGEN SATURATION: 100 % | TEMPERATURE: 98 F | DIASTOLIC BLOOD PRESSURE: 71 MMHG | RESPIRATION RATE: 16 BRPM | HEART RATE: 84 BPM

## 2022-05-10 DIAGNOSIS — I48.91 UNSPECIFIED ATRIAL FIBRILLATION: ICD-10-CM

## 2022-05-10 DIAGNOSIS — Z29.9 ENCOUNTER FOR PROPHYLACTIC MEASURES, UNSPECIFIED: ICD-10-CM

## 2022-05-10 DIAGNOSIS — R17 UNSPECIFIED JAUNDICE: ICD-10-CM

## 2022-05-10 DIAGNOSIS — D72.829 ELEVATED WHITE BLOOD CELL COUNT, UNSPECIFIED: ICD-10-CM

## 2022-05-10 LAB
ALBUMIN SERPL ELPH-MCNC: 4.9 G/DL — SIGNIFICANT CHANGE UP (ref 3.3–5)
ALP SERPL-CCNC: 50 U/L — SIGNIFICANT CHANGE UP (ref 40–120)
ALT FLD-CCNC: 19 U/L — SIGNIFICANT CHANGE UP (ref 4–41)
ANION GAP SERPL CALC-SCNC: 13 MMOL/L — SIGNIFICANT CHANGE UP (ref 7–14)
AST SERPL-CCNC: 18 U/L — SIGNIFICANT CHANGE UP (ref 4–40)
BASOPHILS # BLD AUTO: 0.02 K/UL — SIGNIFICANT CHANGE UP (ref 0–0.2)
BASOPHILS NFR BLD AUTO: 0.2 % — SIGNIFICANT CHANGE UP (ref 0–2)
BILIRUB SERPL-MCNC: 1.4 MG/DL — HIGH (ref 0.2–1.2)
BUN SERPL-MCNC: 10 MG/DL — SIGNIFICANT CHANGE UP (ref 7–23)
CALCIUM SERPL-MCNC: 9.7 MG/DL — SIGNIFICANT CHANGE UP (ref 8.4–10.5)
CHLORIDE SERPL-SCNC: 105 MMOL/L — SIGNIFICANT CHANGE UP (ref 98–107)
CO2 SERPL-SCNC: 23 MMOL/L — SIGNIFICANT CHANGE UP (ref 22–31)
CREAT SERPL-MCNC: 0.86 MG/DL — SIGNIFICANT CHANGE UP (ref 0.5–1.3)
EGFR: 122 ML/MIN/1.73M2 — SIGNIFICANT CHANGE UP
EOSINOPHIL # BLD AUTO: 0.12 K/UL — SIGNIFICANT CHANGE UP (ref 0–0.5)
EOSINOPHIL NFR BLD AUTO: 1.1 % — SIGNIFICANT CHANGE UP (ref 0–6)
GLUCOSE SERPL-MCNC: 91 MG/DL — SIGNIFICANT CHANGE UP (ref 70–99)
HCT VFR BLD CALC: 48.9 % — SIGNIFICANT CHANGE UP (ref 39–50)
HGB BLD-MCNC: 17 G/DL — SIGNIFICANT CHANGE UP (ref 13–17)
IANC: 8.06 K/UL — HIGH (ref 1.8–7.4)
IMM GRANULOCYTES NFR BLD AUTO: 0.4 % — SIGNIFICANT CHANGE UP (ref 0–1.5)
LYMPHOCYTES # BLD AUTO: 1.65 K/UL — SIGNIFICANT CHANGE UP (ref 1–3.3)
LYMPHOCYTES # BLD AUTO: 15.4 % — SIGNIFICANT CHANGE UP (ref 13–44)
MAGNESIUM SERPL-MCNC: 2.1 MG/DL — SIGNIFICANT CHANGE UP (ref 1.6–2.6)
MCHC RBC-ENTMCNC: 29.2 PG — SIGNIFICANT CHANGE UP (ref 27–34)
MCHC RBC-ENTMCNC: 34.8 GM/DL — SIGNIFICANT CHANGE UP (ref 32–36)
MCV RBC AUTO: 84 FL — SIGNIFICANT CHANGE UP (ref 80–100)
MONOCYTES # BLD AUTO: 0.8 K/UL — SIGNIFICANT CHANGE UP (ref 0–0.9)
MONOCYTES NFR BLD AUTO: 7.5 % — SIGNIFICANT CHANGE UP (ref 2–14)
NEUTROPHILS # BLD AUTO: 8.06 K/UL — HIGH (ref 1.8–7.4)
NEUTROPHILS NFR BLD AUTO: 75.4 % — SIGNIFICANT CHANGE UP (ref 43–77)
NRBC # BLD: 0 /100 WBCS — SIGNIFICANT CHANGE UP
NRBC # FLD: 0 K/UL — SIGNIFICANT CHANGE UP
PLATELET # BLD AUTO: 231 K/UL — SIGNIFICANT CHANGE UP (ref 150–400)
POTASSIUM SERPL-MCNC: 3.9 MMOL/L — SIGNIFICANT CHANGE UP (ref 3.5–5.3)
POTASSIUM SERPL-SCNC: 3.9 MMOL/L — SIGNIFICANT CHANGE UP (ref 3.5–5.3)
PROT SERPL-MCNC: 7.5 G/DL — SIGNIFICANT CHANGE UP (ref 6–8.3)
RBC # BLD: 5.82 M/UL — HIGH (ref 4.2–5.8)
RBC # FLD: 13 % — SIGNIFICANT CHANGE UP (ref 10.3–14.5)
SARS-COV-2 RNA SPEC QL NAA+PROBE: SIGNIFICANT CHANGE UP
SODIUM SERPL-SCNC: 141 MMOL/L — SIGNIFICANT CHANGE UP (ref 135–145)
TROPONIN T, HIGH SENSITIVITY RESULT: 11 NG/L — SIGNIFICANT CHANGE UP
WBC # BLD: 10.69 K/UL — HIGH (ref 3.8–10.5)
WBC # FLD AUTO: 10.69 K/UL — HIGH (ref 3.8–10.5)

## 2022-05-10 PROCEDURE — 71046 X-RAY EXAM CHEST 2 VIEWS: CPT | Mod: 26

## 2022-05-10 PROCEDURE — 99223 1ST HOSP IP/OBS HIGH 75: CPT

## 2022-05-10 PROCEDURE — 93000 ELECTROCARDIOGRAM COMPLETE: CPT

## 2022-05-10 PROCEDURE — 99222 1ST HOSP IP/OBS MODERATE 55: CPT | Mod: GC

## 2022-05-10 PROCEDURE — 99285 EMERGENCY DEPT VISIT HI MDM: CPT

## 2022-05-10 RX ORDER — ATENOLOL 25 MG/1
100 TABLET ORAL DAILY
Refills: 0 | Status: DISCONTINUED | OUTPATIENT
Start: 2022-05-10 | End: 2022-05-11

## 2022-05-10 RX ORDER — ATENOLOL 25 MG/1
50 TABLET ORAL ONCE
Refills: 0 | Status: COMPLETED | OUTPATIENT
Start: 2022-05-10 | End: 2022-05-10

## 2022-05-10 RX ORDER — APIXABAN 2.5 MG/1
5 TABLET, FILM COATED ORAL EVERY 12 HOURS
Refills: 0 | Status: DISCONTINUED | OUTPATIENT
Start: 2022-05-10 | End: 2022-05-11

## 2022-05-10 RX ADMIN — APIXABAN 5 MILLIGRAM(S): 2.5 TABLET, FILM COATED ORAL at 18:53

## 2022-05-10 RX ADMIN — ATENOLOL 50 MILLIGRAM(S): 25 TABLET ORAL at 18:53

## 2022-05-10 NOTE — H&P ADULT - NEUROLOGICAL DETAILS
alert and oriented x 3/responds to verbal commands/cranial nerves intact alert and oriented x 3/cranial nerves intact/normal strength

## 2022-05-10 NOTE — H&P ADULT - ASSESSMENT
27 yo male with PMH HCM and cardiofaciocutaneous syndrome who presented to ED from PCP office w/ new onset AF.  27 yo male with MHx of HCM and cardiofaciocutaneous syndrome referred by PCP for palpitations a/w new onset AF; Found to have leukocytosis;

## 2022-05-10 NOTE — H&P ADULT - NSHPSOCIALHISTORY_GEN_ALL_CORE
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

## 2022-05-10 NOTE — H&P ADULT - HISTORY OF PRESENT ILLNESS
27 yo male with PMH HCM and cardiofaciocutaneous syndrome who presented to ED from PCP office w/ new onset AF. Patient states he has been having intermittent episodes of palpitations since Friday. He states the palpitations last few seconds then resolves. He cannot recall any relieving and exacerbating factors. HE felt the palpitations are worsening, so he followed up his cardiologist. He was found to have afib at cardiologist's office and was sent to ED for further eval. Denies fever, chills, cough, chest pain, SOB, abdominal pain, nausea, vomiting, melena, hematochezia, LE edema or dysuria  27 yo male with MHx of HCM and cardiofaciocutaneous syndrome who presented to ED from PCP office w/ new onset AF. Patient states he has been having intermittent episodes of palpitations since Friday. He states the palpitations last few seconds then resolves. He cannot recall any relieving and exacerbating factors. HE felt the palpitations are worsening, so he followed up his cardiologist. He was found to have afib at cardiologist's office and was sent to ED for further eval. Denies fever, chills, cough, chest pain, SOB, abdominal pain, nausea, vomiting, melena, hematochezia, LE edema or dysuria  27 yo male with MHx of HCM and cardiofaciocutaneous syndrome who presented to ED from PCP office w/ new onset AF. Patient states he has been having intermittent episodes of palpitations since Friday. He states the palpitations last few seconds then resolves. He cannot recall any relieving and exacerbating factors. HE felt the palpitations are worsening, so he followed up his cardiologist. He was found to have afib at cardiologist's office and was sent to ED for further eval. Denies fever, chills, cough, chest pain, SOB, abdominal pain, nausea, vomiting, melena, hematochezia, LE edema or dysuria     ED course: EP consultation

## 2022-05-10 NOTE — ED ADULT NURSE NOTE - CHIEF COMPLAINT QUOTE
Medium Risk Pt states that he has been feeling palpitations since Friday, was seen by PMD and found to be in Afib/Flutter.  PMH: HCM, CFC

## 2022-05-10 NOTE — ED PROVIDER NOTE - OBJECTIVE STATEMENT
26M with pmh HOCM and Cardiofasciocutaneous syndrome who presents with palpitations since Friday.  Seen by PMD (Priti) today, with EKG showing atrial fibrillation, new onset.  No RVR, with rate 80s-90s.  PMD prefers admission to cardiologist Jonatan Villanueva.    No SOB, CP, fevers, cough, abd pain, n/v/d/c.

## 2022-05-10 NOTE — CONSULT NOTE ADULT - ASSESSMENT
Pt is a 27 yo male with PMH HCM and cardiofaciocutaneous syndrome who presented to ED from PCP office w/ new onset AF.     Plan  **To discuss w/ attending  -f/u CBC, CMP, coags, TSH; if no concern for active bleed, likely start eliquis 5mg BID unless suspicion for valvular AF  -obtain TTE/MISAEL with plan for DCCV  -increase atenolol to 100mg QD   **To discuss w/ attending Pt is a 27 yo male with PMH HCM and cardiofaciocutaneous syndrome who presented to ED from PCP office w/ new onset AF.     Plan  **To discuss w/ attending  -f/u CBC, CMP, coags, TSH; CHADSVASC: 0; no indication for AC unless high suspicion for valvular AF  -obtain TTE/MISAEL with plan for DCCV  -increase atenolol to 100mg QD   **To discuss w/ attending Pt is a 27 yo male with PMH HCM and cardiofaciocutaneous syndrome who presented to ED from PCP office w/ new onset AF.     Plan  **To discuss w/ attending  -f/u CBC, CMP, coags, TSH; CHADSVASC: 0; in the setting of HOCM, patient will require AC despite CHADSVASC score; can start eliquis 5mg BID if no suspicion for active hemorrhage or valvular AF  -obtain TTE/MISAEL with plan for DCCV  -increase atenolol to 100mg QD   **To discuss w/ attending Pt is a 27 yo male with PMH HCM and cardiofaciocutaneous syndrome who presented to ED from PCP office w/ new onset AF.     Plan  -f/u CBC, CMP, coags, TSH; CHADSVASC: 0; in the setting of HOCM, patient will require AC despite CHADSVASC score; can start eliquis 5mg BID if no suspicion for active hemorrhage or valvular AF  -obtain TTE/MISAEL with plan for DCCV  -increase atenolol to 100mg QD

## 2022-05-10 NOTE — CONSULT NOTE ADULT - NS ATTEND AMEND GEN_ALL_CORE FT
Pt is a 25 yo male with PMH HCM and cardiofaciocutaneous syndrome who presented to ED from PCP office w/ new onset AF.   -f/u CBC, CMP, coags, TSH; CHADSVASC: 0; in the setting of HOCM, patient will require AC despite CHADSVASC score; can start Eliquis 5mg BID if no suspicion for active hemorrhage or valvular AF  -obtain TTE/MISAEL with plan for DCCV  -increase atenolol to 100mg QD

## 2022-05-10 NOTE — ED ADULT TRIAGE NOTE - CHIEF COMPLAINT QUOTE
Pt states that he has been feeling palpitations since Friday, was seen by PMD and found to be in Afib/Flutter.  PMH: HCM, CFC

## 2022-05-10 NOTE — H&P ADULT - PROBLEM SELECTOR PLAN 2
on eliquis for VTE px Monitor wBC, no fevers. Monitor for fevers  CXR clear lungs  covid neg  check UA  if worsening leukocytosis or spiking fever, then obtain blood cultures No clinical evidence of infection;   Monitor WBC  CXR clear lungs  Covid-19 neg  Check UA WBC No clinical evidence of infection;   Monitor WBC  CXR clear lungs  Covid-19 neg  Check UA WBC=10K; No clinical evidence of infection;   Monitor WBC  CXR clear lungs  Covid-19 neg  Check UA

## 2022-05-10 NOTE — ED PROVIDER NOTE - ATTENDING CONTRIBUTION TO CARE
Attending MD Justin. Agree with above.  Pt is a 27 yo male with pmhx of hypertrophic cardiomyopathy and cardiofaciocutaneous syndrome who presents to Ed from PCP office where he was seen today after he'd had palpitations all weekend.  Pt found to be in new onset afib.  Pt endorses palpitations since Friday and notes that they improved when he took his baseline atenolol but did not resolve.  Denies CP but endorses 'funny feeling' with palpitations.

## 2022-05-10 NOTE — CONSULT NOTE ADULT - SUBJECTIVE AND OBJECTIVE BOX
Patient seen and evaluated at bedside    HPI:  Pt is a 25 yo male with PMH HCM and cardiofaciocutaneous syndrome who presented to ED from PCP office w/ new onset AF. Pt endorses palpitations since Friday and notes that they improved when he took his home atenolol but did not resolve.     PMHx:   Hypertrophic cardiomegaly    History of hypertrophic cardiomyopathy      PSHx:     FAMILY HISTORY:    Allergies:  No Known Allergies    Home Medications:    Current Medications:     Social History  Smoking History: denies  Alcohol Use: denies  Drug Use: denies    REVIEW OF SYSTEMS:  Constitutional:     [X] negative [ ] fevers [ ] chills [ ] weight loss [ ] weight gain  HEENT:                  [X] negative [ ] dry eyes [ ] eye irritation [ ] postnasal drip [ ] nasal congestion  CV:                         [ ] negative  [ ] chest pain [ ] orthopnea [X] palpitations [ ] murmur  Resp:                     [X] negative [ ] cough [ ] shortness of breath [ ] wheezing [ ] sputum [ ] hemoptysis  GI:                          [X] negative [ ] nausea [ ] vomiting [ ] diarrhea [ ] constipation [ ] abd pain [ ] dysphagia   :                        [X] negative [ ] dysuria [ ] nocturia [ ] hematuria [ ] increased urinary frequency  MSK:                      [X] negative [ ] back pain [ ] myalgias [ ] arthralgias [ ] fracture  Skin:                       [X] negative [ ] rash [ ] itch  Neuro:                   [X] negative [ ] headache [ ] dizziness [ ] syncope [ ] weakness [ ] numbness  Psych:                    [X] negative [ ] anxiety [ ] depression  Endo:                     [X] negative [ ] diabetes [ ] thyroid problem  Heme/Lymph:      [X] negative [ ] anemia [ ] bleeding problem  Allergic/Immune: [X] negative [ ] itchy eyes [ ] nasal discharge [ ] hives [ ] angioedema    [X] All other systems negative or otherwise described above.  [ ] Unable to assess ROS because ________.    ICU Vital Signs Last 24 Hrs  T(C): 36.7 (10 May 2022 11:42), Max: 36.7 (10 May 2022 11:42)  T(F): 98 (10 May 2022 11:42), Max: 98 (10 May 2022 11:42)  HR: 84 (10 May 2022 11:42) (84 - 84)  BP: 121/71 (10 May 2022 11:42) (121/71 - 121/71)  BP(mean): --  ABP: --  ABP(mean): --  RR: 16 (10 May 2022 11:42) (16 - 16)  SpO2: 100% (10 May 2022 11:42) (100% - 100%)    Orthostatic VS    Daily     Daily   I&O's Summary      Physical Exam:  GENERAL: No acute distress, well-developed  HEAD:  Atraumatic, Normocephalic  ENT: EOMI, conjunctiva and sclera clear, Neck supple, No JVD, moist mucosa  CHEST/LUNG: Clear to auscultation bilaterally; No wheeze, equal breath sounds bilaterally   BACK: No spinal tenderness  HEART: Regular rate and rhythm; No murmurs, rubs, or gallops, radial and DP 2+ b/l, euvolemic  ABDOMEN: Soft, Nontender, Nondistended  EXTREMITIES:  No clubbing, cyanosis, or edema  PSYCH: Nl behavior, nl affect  NEUROLOGY: AAOx3, non-focal  SKIN: Normal color, No rashes or lesions  LINES: no central lines present    LABS:                BNP:   proBNP:   Lipid Profile:     HgA1c:   TSH:         RADIOLOGY & ADDITIONAL STUDIES:    Cardiovascular Diagnostic Testing    ECG: Personally reviewed    Telemetry: reviewed    Echo: Personally reviewed    6/2020  1. Obstructive HCM  2. Mildly dilated LA  3. Trivial MR  4. 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 LVOT gradients pre and post Sildenafil were not significantly changed, peak 55mmHg, mean 22mmHg  6. Hyperdynamic LV shortening fraction and qualitatively hyperdynamic LV systolic function  7. Mildly dilated aortic root  8. Normal RV morphology w/ qualitatively normal size and systolic function    Stress Testing: none    Cath: none    CXR: clear lungs; official read pending    Other cardiac imaging: none    Other misc imaging: none

## 2022-05-10 NOTE — PATIENT PROFILE ADULT - FALL HARM RISK - HARM RISK INTERVENTIONS

## 2022-05-10 NOTE — H&P ADULT - PROBLEM SELECTOR PLAN 1
Monitor on tele  [id number] score of 0  EP consult appreciated  EP recommended to start eliquis 5mg BID for AC given patient's hx of HOCM despite [id number] score of 0  NPO p MN for MISAEL/DCCV in AM  Increase atenolol to 100mg New Dx of Afib;  EKG: Afib with no RVR  Monitor on tele  [id number] score of 0  EP consult appreciated  EP recommended to start eliquis 5mg BID for AC given patient's hx of HOCM despite [id number] score of 0  NPO p MN for MISAEL/DCCV in AM  Increase atenolol to 100mg

## 2022-05-10 NOTE — ED PROVIDER NOTE - PHYSICAL EXAMINATION
Vital signs reviewed.  CONSTITUTIONAL: NAD, awake  HEAD: Normocephalic; atraumatic  EYES: EOMI, no conjunctival injection, no scleral icterus  MOUTH/THROAT:  MMM  NECK: Trachea midline  CV: +irregularly irregular, +crescendo decrescendo BOUBACAR; Normal S1, S2; extremities WWP  RESP: normal work of breathing; CTAB, no stridor  ABD: soft, non-distended; non-tender  : Deferred  MSK/EXT: no edema, no limited ROM  SKIN: No rashes on exposed skin surfaces  NEURO: Moves all extremities spontaneously with no focal deficits, speech is appropriate

## 2022-05-10 NOTE — H&P ADULT - NSHPPHYSICALEXAM_GEN_ALL_CORE
Vital Signs Last 24 Hrs  T(C): 36.9 (10 May 2022 21:50), Max: 36.9 (10 May 2022 15:15)  T(F): 98.5 (10 May 2022 21:50), Max: 98.5 (10 May 2022 15:15)  HR: 73 (10 May 2022 21:50) (72 - 88)  BP: 112/69 (10 May 2022 21:50) (109/68 - 121/71)  BP(mean): --  RR: 17 (10 May 2022 21:50) (16 - 17)  SpO2: 99% (10 May 2022 21:50) (98% - 100%)

## 2022-05-10 NOTE — H&P ADULT - NS ATTEND AMEND GEN_ALL_CORE FT
D 25 yo male with MHx of HCM and cardiofaciocutaneous syndrome referred by PCP for palpitations a/w new onset AF; Found to have leukocytosis; 27yo male with MHx of HCM and cardiofaciocutaneous syndrome referred by PCP for palpitations a/w new onset AF; Found to have leukocytosis;    Assessment and plan supplemented and modified by attending where indicated;

## 2022-05-10 NOTE — ED PROVIDER NOTE - NS ED ROS FT
In additional the that documented in the HPI, the additional ROS was obtained:    CONSTITUTIONAL: No fever, no chills  EYES: no change in vision, no blurred vision  HEENT: no trouble swallowing, no trouble speaking, no sore throat  CV: no chest pain, +palpitations  RESP: no cough, no shortness of breath  GI: no abdominal pain, no nausea, no vomiting, no diarrhea, no constipation  : No dysuria, no frequency  NEURO: no headache, no new numbness, no weakness, no dizziness  SKIN: no new rashes  HEME: No easy bruising or bleeding  MSK: no recent trauma  Beny Humphries M.D. -Resident

## 2022-05-10 NOTE — H&P ADULT - PROBLEM SELECTOR PLAN 3
Bili; 1.4  No previous labs to compare  Monitor closely  Outpatient follow up Tot Bili=1.4  No previous labs to compare  Monitor   Outpatient follow up with PCP

## 2022-05-10 NOTE — H&P ADULT - NSHPLABSRESULTS_GEN_ALL_CORE
EKG                          17.0   10.69 )-----------( 231      ( 10 May 2022 13:19 )             48.9       05-10    141  |  105  |  10  ----------------------------<  91  3.9   |  23  |  0.86    Ca    9.7      10 May 2022 13:19  Mg     2.10     05-10    TPro  7.5  /  Alb  4.9  /  TBili  1.4<H>  /  DBili  x   /  AST  18  /  ALT  19  /  AlkPhos  50  05-10      Troponin T, High Sensitivity Result: 11: Rapid changes upward or downward in high-sensitivity troponin levels  strongly suggest acute myocardial injury. Hemolysis may falsely lower  results. Renal impairment may increase results.  Normal: <6 - 14 ng/L  Indeterminate: 15 - 51 ng/L  Elevated:>51 ng/L  Please see "[email or web address] on the [employer] intranet for  more information. ng/L (05.10.22 @ 13:19)      < from: Xray Chest 2 Views PA/Lat (05.10.22 @ 13:09) >    Impression: No acute pulmonary disease.    < end of copied text > EKG: afib 79 bpm, TWI AVL, AVR, tall T In V2-V4                          17.0   10.69 )-----------( 231      ( 10 May 2022 13:19 )             48.9       05-10    141  |  105  |  10  ----------------------------<  91  3.9   |  23  |  0.86    Ca    9.7      10 May 2022 13:19  Mg     2.10     05-10    TPro  7.5  /  Alb  4.9  /  TBili  1.4<H>  /  DBili  x   /  AST  18  /  ALT  19  /  AlkPhos  50  05-10      Troponin T, High Sensitivity Result: 11: Rapid changes upward or downward in high-sensitivity troponin levels  strongly suggest acute myocardial injury. Hemolysis may falsely lower  results. Renal impairment may increase results.  Normal: <6 - 14 ng/L  Indeterminate: 15 - 51 ng/L  Elevated:>51 ng/L  Please see "[email or web address] on the [facility] intranet for  more information. ng/L (05.10.22 @ 13:19)      < from: Xray Chest 2 Views PA/Lat (05.10.22 @ 13:09) >    Impression: No acute pulmonary disease.    < end of copied text > -personally reviewed EKG: afib 79 bpm, qtc 460, no acute Tw or ST changes, no PACs or PVCs                          17.0   10.69 )-----------( 231      ( 10 May 2022 13:19 )             48.9       05-10    141  |  105  |  10  ----------------------------<  91  3.9   |  23  |  0.86    Ca    9.7      10 May 2022 13:19  Mg     2.10     05-10    TPro  7.5  /  Alb  4.9  /  TBili  1.4<H>  /  DBili  x   /  AST  18  /  ALT  19  /  AlkPhos  50  05-10      Troponin T, High Sensitivity Result: 11: Rapid changes upward or downward in high-sensitivity troponin levels  strongly suggest acute myocardial injury. Hemolysis may falsely lower  results. Renal impairment may increase results.  Normal: <6 - 14 ng/L  Indeterminate: 15 - 51 ng/L  Elevated:>51 ng/L  Please see "[email or web address] on the [facility] intranet for  more information. ng/L (05.10.22 @ 13:19)      < from: Xray Chest 2 Views PA/Lat (05.10.22 @ 13:09) >    Impression: No acute pulmonary disease.    < end of copied text > -personally interpreted EKG: afib 79 bpm, qtc 460, no acute Tw or ST changes, no PACs or PVCs               -personally reviewed labs:               17.0   10.69 )-----------( 231      ( 10 May 2022 13:19 )             48.9       05-10    141  |  105  |  10  ----------------------------<  91  3.9   |  23  |  0.86    Ca    9.7      10 May 2022 13:19  Mg     2.10     05-10    TPro  7.5  /  Alb  4.9  /  TBili  1.4<H>  /  DBili  x   /  AST  18  /  ALT  19  /  AlkPhos  50  05-10      Troponin T, High Sensitivity Result: 11: Rapid changes upward or downward in high-sensitivity troponin levels  strongly suggest acute myocardial injury. Hemolysis may falsely lower  results. Renal impairment may increase results.  Normal: <6 - 14 ng/L  Indeterminate: 15 - 51 ng/L  Elevated:>51 ng/L  Please see "[email or web address] on the [facility] intranet for  more information. ng/L (05.10.22 @ 13:19)    -personally interpreted CXR: clear lungs, no pleural effusions, no pneumothorax

## 2022-05-10 NOTE — ED ADULT NURSE NOTE - OBJECTIVE STATEMENT
Received pt to Trauma C from MD office with c/o new onset atrial fibrillation. Pt has hx of hypertrophic cardiomyopathy and cardio-yogesh cutaneous syndrome. Pt reports feeling palpitations x 4 days, was seen by cardiologist today who found pt to be in new onset afib. Pt denies chest pain, difficulty breathing, abdominal pain, n/v/d or other complaints. Pt is A&OX4, skin warm dry unremarkable, + strong irregular radial pulses bi laterally. Pt changed into gown and placed on cardiac monitor showing AFib @85-90 bpm. #18g IV placed to RAC. MD at bedside for eval.

## 2022-05-11 ENCOUNTER — TRANSCRIPTION ENCOUNTER (OUTPATIENT)
Age: 27
End: 2022-05-11

## 2022-05-11 VITALS
SYSTOLIC BLOOD PRESSURE: 108 MMHG | OXYGEN SATURATION: 100 % | TEMPERATURE: 99 F | DIASTOLIC BLOOD PRESSURE: 65 MMHG | HEART RATE: 70 BPM | RESPIRATION RATE: 17 BRPM

## 2022-05-11 PROBLEM — Z86.79 PERSONAL HISTORY OF OTHER DISEASES OF THE CIRCULATORY SYSTEM: Chronic | Status: ACTIVE | Noted: 2022-05-10

## 2022-05-11 LAB
ANION GAP SERPL CALC-SCNC: 10 MMOL/L — SIGNIFICANT CHANGE UP (ref 7–14)
BUN SERPL-MCNC: 11 MG/DL — SIGNIFICANT CHANGE UP (ref 7–23)
CALCIUM SERPL-MCNC: 9.2 MG/DL — SIGNIFICANT CHANGE UP (ref 8.4–10.5)
CHLORIDE SERPL-SCNC: 105 MMOL/L — SIGNIFICANT CHANGE UP (ref 98–107)
CO2 SERPL-SCNC: 22 MMOL/L — SIGNIFICANT CHANGE UP (ref 22–31)
CREAT SERPL-MCNC: 0.73 MG/DL — SIGNIFICANT CHANGE UP (ref 0.5–1.3)
EGFR: 128 ML/MIN/1.73M2 — SIGNIFICANT CHANGE UP
GLUCOSE SERPL-MCNC: 91 MG/DL — SIGNIFICANT CHANGE UP (ref 70–99)
HCT VFR BLD CALC: 44.9 % — SIGNIFICANT CHANGE UP (ref 39–50)
HGB BLD-MCNC: 15.8 G/DL — SIGNIFICANT CHANGE UP (ref 13–17)
INR BLD: 1.36 RATIO — HIGH (ref 0.88–1.16)
MAGNESIUM SERPL-MCNC: 2.1 MG/DL — SIGNIFICANT CHANGE UP (ref 1.6–2.6)
MCHC RBC-ENTMCNC: 28.7 PG — SIGNIFICANT CHANGE UP (ref 27–34)
MCHC RBC-ENTMCNC: 35.2 GM/DL — SIGNIFICANT CHANGE UP (ref 32–36)
MCV RBC AUTO: 81.6 FL — SIGNIFICANT CHANGE UP (ref 80–100)
NRBC # BLD: 0 /100 WBCS — SIGNIFICANT CHANGE UP
NRBC # FLD: 0 K/UL — SIGNIFICANT CHANGE UP
PHOSPHATE SERPL-MCNC: 3.8 MG/DL — SIGNIFICANT CHANGE UP (ref 2.5–4.5)
PLATELET # BLD AUTO: 201 K/UL — SIGNIFICANT CHANGE UP (ref 150–400)
POTASSIUM SERPL-MCNC: 3.7 MMOL/L — SIGNIFICANT CHANGE UP (ref 3.5–5.3)
POTASSIUM SERPL-SCNC: 3.7 MMOL/L — SIGNIFICANT CHANGE UP (ref 3.5–5.3)
PROTHROM AB SERPL-ACNC: 15.8 SEC — HIGH (ref 10.5–13.4)
RBC # BLD: 5.5 M/UL — SIGNIFICANT CHANGE UP (ref 4.2–5.8)
RBC # FLD: 13.1 % — SIGNIFICANT CHANGE UP (ref 10.3–14.5)
SODIUM SERPL-SCNC: 137 MMOL/L — SIGNIFICANT CHANGE UP (ref 135–145)
TSH SERPL-MCNC: 2.04 UIU/ML — SIGNIFICANT CHANGE UP (ref 0.27–4.2)
WBC # BLD: 6.53 K/UL — SIGNIFICANT CHANGE UP (ref 3.8–10.5)
WBC # FLD AUTO: 6.53 K/UL — SIGNIFICANT CHANGE UP (ref 3.8–10.5)

## 2022-05-11 PROCEDURE — 99232 SBSQ HOSP IP/OBS MODERATE 35: CPT | Mod: GC

## 2022-05-11 PROCEDURE — 93312 ECHO TRANSESOPHAGEAL: CPT | Mod: 26

## 2022-05-11 PROCEDURE — 99233 SBSQ HOSP IP/OBS HIGH 50: CPT

## 2022-05-11 PROCEDURE — 93306 TTE W/DOPPLER COMPLETE: CPT | Mod: 26

## 2022-05-11 PROCEDURE — 92960 CARDIOVERSION ELECTRIC EXT: CPT

## 2022-05-11 RX ORDER — ATENOLOL 25 MG/1
1 TABLET ORAL
Qty: 30 | Refills: 0
Start: 2022-05-11 | End: 2022-06-09

## 2022-05-11 RX ORDER — APIXABAN 2.5 MG/1
1 TABLET, FILM COATED ORAL
Qty: 60 | Refills: 0
Start: 2022-05-11 | End: 2022-06-09

## 2022-05-11 RX ORDER — ATENOLOL 25 MG/1
1 TABLET ORAL
Qty: 0 | Refills: 0 | DISCHARGE

## 2022-05-11 RX ADMIN — ATENOLOL 100 MILLIGRAM(S): 25 TABLET ORAL at 05:29

## 2022-05-11 RX ADMIN — APIXABAN 5 MILLIGRAM(S): 2.5 TABLET, FILM COATED ORAL at 05:29

## 2022-05-11 NOTE — DISCHARGE NOTE NURSING/CASE MANAGEMENT/SOCIAL WORK - PATIENT PORTAL LINK FT
You can access the FollowMyHealth Patient Portal offered by [facility] by registering at the following website: [email or web address]. By joining [facility]’s FollowMyHealth portal, you will also be able to view your health information using other applications (apps) compatible with our system.

## 2022-05-11 NOTE — CHART NOTE - NSCHARTNOTEFT_GEN_A_CORE
This is a 26 year old man with PMHx of  HCM and cardiofaciocutaneous syndrome who presented to ED from PCP office where he was found to have new onset AF with palpitation. EP team was consulted for Afib management. Ep recommended Eliquis and MISAEL/DCCV s/p MISAEL/DCCV to SR on 5/411/2022. Patient was given follow-up appointment with Dr. Villanueva on 6/17/2022 at 11:30am.    Sue Zhao PA-C

## 2022-05-11 NOTE — DISCHARGE NOTE PROVIDER - HOSPITAL COURSE
25 yo male with MHx of HCM and cardiofaciocutaneous syndrome who presented to ED from PCP office w/ new onset AF. Patient underwent DCCV on 5/11. On 5/11 this case was reviewed with Dr. Pal, the patient is medically stable and optimized for discharge. All medications were reviewed and prescriptions were sent to mutually agreed upon pharmacy. 25 yo male with MHx of HCM and cardiofaciocutaneous syndrome who presented to ED from PCP office w/ new onset AF. Patient underwent DCCV on 5/11. On 5/11 this case was reviewed with Dr. Pal, the patient is medically stable and optimized for discharge. All medications were reviewed and prescriptions were sent to mutually agreed upon pharmacy.     VIVO to provide patient with free trial month & $10 copay card provided to patient for refills w/ instructions for use.

## 2022-05-11 NOTE — PROGRESS NOTE ADULT - SUBJECTIVE AND OBJECTIVE BOX
[facility] Division of Hospital Medicine  Rush Pal DO  Pager (M-F, 8S-5P): [phone]      Patient is a 26y old  Male who presents with a chief complaint of Referred by PCP for new onset afib (11 May 2022 07:05)      SUBJECTIVE / OVERNIGHT EVENTS: no overnight events   ADDITIONAL REVIEW OF SYSTEMS:    MEDICATIONS  (STANDING):  apixaban 5 milliGRAM(s) Oral every 12 hours  ATENolol  Tablet 100 milliGRAM(s) Oral daily    MEDICATIONS  (PRN):      CAPILLARY BLOOD GLUCOSE        I&O's Summary      PHYSICAL EXAM:  Vital Signs Last 24 Hrs  T(C): 36.7 (11 May 2022 05:53), Max: 36.9 (10 May 2022 15:15)  T(F): 98.1 (11 May 2022 05:53), Max: 98.5 (10 May 2022 15:15)  HR: 74 (11 May 2022 05:53) (72 - 88)  BP: 114/61 (11 May 2022 05:53) (109/68 - 121/71)  BP(mean): --  RR: 17 (11 May 2022 05:53) (16 - 17)  SpO2: 100% (11 May 2022 05:53) (98% - 100%)  CONSTITUTIONAL: NAD, well-developed, well-groomed  EYES: EOMI; conjunctiva and sclera clear  ENMT: Moist oral mucosa, no pharyngeal injection or exudates; normal dentition  NECK: Supple, no palpable masses; no thyromegaly  RESPIRATORY: Normal respiratory effort; lungs are clear to auscultation bilaterally  CARDIOVASCULAR: Regular rate and rhythm, normal S1 and S2, no murmur/rub/gallop; No lower extremity edema; Peripheral pulses are 2+ bilaterally  ABDOMEN: Nontender to palpation, normoactive bowel sounds, no rebound/guarding; No hepatosplenomegaly  MUSKULOSKELETAL:  no clubbing or cyanosis of digits; no joint swelling or tenderness to palpation  PSYCH: A+O to person, place, and time; affect appropriate  NEUROLOGY: CN 2-12 are intact and symmetric; no gross sensory deficits;   SKIN: No rashes; no palpable lesions    LABS:                        15.8   6.53  )-----------( 201      ( 11 May 2022 05:23 )             44.9     05-11    137  |  105  |  11  ----------------------------<  91  3.7   |  22  |  0.73    Ca    9.2      11 May 2022 05:23  Phos  3.8     05-11  Mg     2.10     05-11    TPro  7.5  /  Alb  4.9  /  TBili  1.4<H>  /  DBili  x   /  AST  18  /  ALT  19  /  AlkPhos  50  05-10    PT/INR - ( 11 May 2022 05:23 )   PT: 15.8 sec;   INR: 1.36 ratio                     RADIOLOGY & ADDITIONAL TESTS:  Results Reviewed:   Imaging Personally Reviewed:  Electrocardiogram Personally Reviewed:    COORDINATION OF CARE:  Care Discussed with Consultants/Other Providers [Y/N]: Y  Prior or Outpatient Records Reviewed [Y/N]: Y   [facility] Division of Hospital Medicine  Rush Pal DO  Pager (M-F, 1P-5P): [phone]      Patient is a 26y old  Male who presents with a chief complaint of Referred by PCP for new onset afib (11 May 2022 07:05)      SUBJECTIVE / OVERNIGHT EVENTS: pt seen in cath recovery, pending MISAEL. family at bedside. Pt reports anxiety, otherwise no complaints.   ADDITIONAL REVIEW OF SYSTEMS: no cp/sob     MEDICATIONS  (STANDING):  apixaban 5 milliGRAM(s) Oral every 12 hours  ATENolol  Tablet 100 milliGRAM(s) Oral daily    MEDICATIONS  (PRN):      CAPILLARY BLOOD GLUCOSE        I&O's Summary      PHYSICAL EXAM:  Vital Signs Last 24 Hrs  T(C): 36.7 (11 May 2022 05:53), Max: 36.9 (10 May 2022 15:15)  T(F): 98.1 (11 May 2022 05:53), Max: 98.5 (10 May 2022 15:15)  HR: 74 (11 May 2022 05:53) (72 - 88)  BP: 114/61 (11 May 2022 05:53) (109/68 - 121/71)  BP(mean): --  RR: 17 (11 May 2022 05:53) (16 - 17)  SpO2: 100% (11 May 2022 05:53) (98% - 100%)  CONSTITUTIONAL: NAD, well-developed, well-groomed  EYES: EOMI; conjunctiva and sclera clear  ENMT: Moist oral mucosa, normal dentition  NECK: Supple  RESPIRATORY: Normal respiratory effort; lungs are clear to auscultation bilaterally  CARDIOVASCULAR: irregularly irregular rhythm, normal S1 and S2, no murmur/rub/gallop  CHEST: pectus carinatum   ABDOMEN: Nontender to palpation, normoactive bowel sounds, soft   MUSKULOSKELETAL:  no clubbing or cyanosis of digits; no joint swelling or tenderness to palpation  PSYCH: A+O to person, place, and time; affect appropriate  NEUROLOGY: CN 2-12 are intact and symmetric; no gross sensory deficits  SKIN: No rashes; no palpable lesions    LABS:                        15.8   6.53  )-----------( 201      ( 11 May 2022 05:23 )             44.9     05-11    137  |  105  |  11  ----------------------------<  91  3.7   |  22  |  0.73    Ca    9.2      11 May 2022 05:23  Phos  3.8     05-11  Mg     2.10     05-11    TPro  7.5  /  Alb  4.9  /  TBili  1.4<H>  /  DBili  x   /  AST  18  /  ALT  19  /  AlkPhos  50  05-10    PT/INR - ( 11 May 2022 05:23 )   PT: 15.8 sec;   INR: 1.36 ratio                     RADIOLOGY & ADDITIONAL TESTS:  Results Reviewed:   Imaging Personally Reviewed:  Electrocardiogram Personally Reviewed:    COORDINATION OF CARE:  Care Discussed with Consultants/Other Providers [Y/N]: Y  Prior or Outpatient Records Reviewed [Y/N]: Y

## 2022-05-11 NOTE — DISCHARGE NOTE PROVIDER - CARE PROVIDER_API CALL
Jonatan Villanueva (MD)  Cardiac Electrophysiology; Cardiovascular Disease; Internal Medicine  [phone] [place] [address], [id number]  [place] [address]  Phone: [phone]  Fax: [phone]  Scheduled Appointment: 06/17/2022 11:30 AM

## 2022-05-11 NOTE — PROGRESS NOTE ADULT - SUBJECTIVE AND OBJECTIVE BOX
INTERVAL EVENTS: No o/n events. Denies CP, dyspnea, palpitations, presyncope, syncope, f/c/n/v.     REVIEW OF SYSTEMS:  Constitutional:     [X] negative [ ] fevers [ ] chills [ ] weight loss [ ] weight gain  HEENT:                  [X] negative [ ] dry eyes [ ] eye irritation [ ] postnasal drip [ ] nasal congestion  CV:                         [ ] negative  [ ] chest pain [ ] orthopnea [X] palpitations [ ] murmur  Resp:                     [X] negative [ ] cough [ ] shortness of breath [ ] wheezing [ ] sputum [ ] hemoptysis  GI:                          [X] negative [ ] nausea [ ] vomiting [ ] diarrhea [ ] constipation [ ] abd pain [ ] dysphagia   :                        [X] negative [ ] dysuria [ ] nocturia [ ] hematuria [ ] increased urinary frequency  MSK:                      [X] negative [ ] back pain [ ] myalgias [ ] arthralgias [ ] fracture  Skin:                       [X] negative [ ] rash [ ] itch  Neuro:                   [X] negative [ ] headache [ ] dizziness [ ] syncope [ ] weakness [ ] numbness  Psych:                    [X] negative [ ] anxiety [ ] depression  Endo:                     [X] negative [ ] diabetes [ ] thyroid problem  Heme/Lymph:      [X] negative [ ] anemia [ ] bleeding problem  Allergic/Immune: [X] negative [ ] itchy eyes [ ] nasal discharge [ ] hives [ ] angioedema    [X] All other systems negative or otherwise described above.  [ ] Unable to assess ROS because ________.    PAST MEDICAL & SURGICAL HISTORY:  Hypertrophic cardiomegaly    History of hypertrophic cardiomyopathy    No significant past surgical history      MEDICATIONS  (STANDING):  apixaban 5 milliGRAM(s) Oral every 12 hours  ATENolol  Tablet 100 milliGRAM(s) Oral daily    MEDICATIONS  (PRN):    ICU Vital Signs Last 24 Hrs  T(C): 36.9 (10 May 2022 21:50), Max: 36.9 (10 May 2022 15:15)  T(F): 98.5 (10 May 2022 21:50), Max: 98.5 (10 May 2022 15:15)  HR: 73 (10 May 2022 21:50) (72 - 88)  BP: 112/69 (10 May 2022 21:50) (109/68 - 121/71)  BP(mean): --  ABP: --  ABP(mean): --  RR: 17 (10 May 2022 21:50) (16 - 17)  SpO2: 99% (10 May 2022 21:50) (98% - 100%)    Orthostatic VS    Daily Height in cm: 160.02 (10 May 2022 21:50)    Daily   I&O's Summary      PHYSICAL EXAM:  GENERAL: No acute distress, well-developed  HEAD:  Atraumatic, Normocephalic  ENT: EOMI, conjunctiva and sclera clear, Neck supple, No JVD, moist mucosa  CHEST/LUNG: Clear to auscultation bilaterally; No wheeze, equal breath sounds bilaterally   BACK: No spinal tenderness  HEART: Irregularly irregular rhythm; No murmurs, rubs, or gallops, radial and DP 2+ b/l, euvolemic  ABDOMEN: Soft, Nontender, Nondistended  EXTREMITIES:  No clubbing, cyanosis, or edema  PSYCH: Nl behavior, nl affect  NEUROLOGY: AAOx3, non-focal  SKIN: Normal color, No rashes or lesions  LINES: no central lines present     LABS:                        17.0   10.69 )-----------( 231      ( 10 May 2022 13:19 )             48.9       05-10    141  |  105  |  10  ----------------------------<  91  3.9   |  23  |  0.86    Ca    9.7      10 May 2022 13:19  Mg     2.10     05-10    TPro  7.5  /  Alb  4.9  /  TBili  1.4<H>  /  DBili  x   /  AST  18  /  ALT  19  /  AlkPhos  50  05-10        BNP:   proBNP:   Lipid Profile:     HgA1c:   TSH:         RADIOLOGY & ADDITIONAL STUDIES:    Cardiovascular Diagnostic Testing    ECG: Personally reviewed  5/10: AF    Telemetry: reviewed; AF    Echo: Personally reviewed    6/2020  1. Obstructive HCM  2. Mildly dilated LA  3. Trivial MR  4. 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 LVOT gradients pre and post Sildenafil were not significantly changed, peak 55mmHg, mean 22mmHg  6. Hyperdynamic LV shortening fraction and qualitatively hyperdynamic LV systolic function  7. Mildly dilated aortic root  8. Normal RV morphology w/ qualitatively normal size and systolic function    Stress Testing: none    Cath: none    CXR: Personally reviewed  < from: Xray Chest 2 Views PA/Lat (05.10.22 @ 13:09) >  Impression: No acute pulmonary disease.    < end of copied text >    Other cardiac imaging: none    Other misc imaging: none

## 2022-05-11 NOTE — PROGRESS NOTE ADULT - ATTENDING COMMENTS
Pt is a 25 yo male with PMH HCM and cardiofaciocutaneous syndrome who presented to ED from PCP office w/ new onset AF. f/u coags, TSH  -CHADSVASC: 0; in the setting of HOCM, patient will require AC despite CHADSVASC score; c/w Eliquis 5mg BID   -keep NPO; obtain TTE/MISAEL with plan for DCCV today  -c/w atenolol 100mg QD

## 2022-05-11 NOTE — PROGRESS NOTE ADULT - ASSESSMENT
Pt is a 27 yo male with PMH HCM and cardiofaciocutaneous syndrome who presented to ED from PCP office w/ new onset AF.     Plan  -f/u coags, TSH  -CHADSVASC: 0; in the setting of HOCM, patient will require AC despite CHADSVASC score; c/w eliquis 5mg BID   -keep NPO; obtain TTE/MISAEL with plan for DCCV today  -c/w atenolol 100mg QD   
27 yo male with MHx of HCM and cardiofaciocutaneous syndrome referred by PCP for palpitations a/w new onset AF; Found to have leukocytosis;

## 2022-05-11 NOTE — DISCHARGE NOTE PROVIDER - NSDCFUSCHEDAPPT_GEN_ALL_CORE_FT
Jonatan Villanueva  [facility]  [facility] [phone]  Scheduled Appointment: 06/17/2022

## 2022-05-11 NOTE — DISCHARGE NOTE PROVIDER - NSDCMRMEDTOKEN_GEN_ALL_CORE_FT
atenolol 100 mg oral tablet: 1 tab(s) orally once a day  atenolol 50 mg oral tablet: 1 tab(s) orally once a day  Eliquis 5 mg oral tablet: 1 tab(s) orally 2 times a day    atenolol 100 mg oral tablet: 1 tab(s) orally once a day  Eliquis 5 mg oral tablet: 1 tab(s) orally 2 times a day

## 2022-05-11 NOTE — PROGRESS NOTE ADULT - PROBLEM SELECTOR PLAN 1
New Dx of Afib  EKG: Afib with no RVR  Monitor on tele  [id number] score of 0  Seen by EP, started on Eliquis and pending MISAEL/DCCV today   c/w increased dose of atenolol 100mg

## 2022-05-11 NOTE — DISCHARGE NOTE PROVIDER - NSDCCPCAREPLAN_GEN_ALL_CORE_FT
PRINCIPAL DISCHARGE DIAGNOSIS  Diagnosis: Atrial fibrillation  Assessment and Plan of Treatment: Please continue your medications as directed and follow-up with your primary provider/cardiologist to further manage your care. Monitor for signs/symptoms of uncontrolled atrial fibrillation, such as, increased heart rate, palpitations, chest pain, dizziness, or shortness of breath - Return to emergency room if these signs/symptoms are present.   ** You were started on Eliquis, continue as directed. Your Atenolol was increased from 50mg once per day to 100mg once per day.  ** Follow up with the EP team as scheduled.

## 2022-06-17 ENCOUNTER — APPOINTMENT (OUTPATIENT)
Dept: ELECTROPHYSIOLOGY | Facility: CLINIC | Age: 27
End: 2022-06-17
Payer: COMMERCIAL

## 2022-06-17 ENCOUNTER — NON-APPOINTMENT (OUTPATIENT)
Age: 27
End: 2022-06-17

## 2022-06-17 VITALS
OXYGEN SATURATION: 99 % | HEART RATE: 57 BPM | WEIGHT: 147 LBS | BODY MASS INDEX: 25.1 KG/M2 | DIASTOLIC BLOOD PRESSURE: 64 MMHG | SYSTOLIC BLOOD PRESSURE: 106 MMHG | HEIGHT: 64 IN

## 2022-06-17 PROCEDURE — 99213 OFFICE O/P EST LOW 20 MIN: CPT

## 2022-06-17 PROCEDURE — 93000 ELECTROCARDIOGRAM COMPLETE: CPT

## 2022-06-17 RX ORDER — ATENOLOL 50 MG/1
50 TABLET ORAL DAILY
Qty: 90 | Refills: 3 | Status: DISCONTINUED | COMMUNITY
Start: 2018-04-10 | End: 2022-06-17

## 2022-06-17 RX ORDER — SILDENAFIL CITRATE 25 MG/1
25 TABLET, FILM COATED ORAL
Refills: 0 | Status: ACTIVE | COMMUNITY
Start: 2022-06-17

## 2022-06-17 RX ORDER — APIXABAN 5 MG/1
5 TABLET, FILM COATED ORAL TWICE DAILY
Refills: 0 | Status: DISCONTINUED | COMMUNITY
Start: 2022-06-17 | End: 2022-06-17

## 2022-06-17 NOTE — PHYSICAL EXAM

## 2022-06-17 NOTE — HISTORY OF PRESENT ILLNESS
[FreeTextEntry1] : Alexsander Moore is a 25 yo male with MHx of HCM and cardiofaciocutaneous syndrome who presented to [facility] ED from PCP office with complaint of palpitations and was found w/ new onset AF. He is s/p DCCV on 5/11/2022.  Today, he is feeling well. Was prescribed Eliquis post hospital discharge, and ran out of medication. Last dose taken on 6/12/22, no bleeding issues. Denies chest pain/pressure, palpitations, SOB, lightheadedness/dizziness, and syncope.\par

## 2022-06-17 NOTE — DISCUSSION/SUMMARY
[FreeTextEntry1] : IMPRESSION:\par \par 1. Paroxysmal AFIB (s/p DCCV 5/11):  EKG performed today to assess presence of AFIB and reveals sinus bradycardia. Remains on Eliquis for thromboembolic prophylaxis. Medication renewal sent to pharmacy on file. Will resume at this time.\par \par Will continue f/u with Cardiologist and may RTO as needed or if any new or worsening symptoms or findings occur. \par \par Sincerely,\par \par Jonatan Villanueva MD

## 2022-09-16 ENCOUNTER — NON-APPOINTMENT (OUTPATIENT)
Age: 27
End: 2022-09-16

## 2022-09-16 ENCOUNTER — APPOINTMENT (OUTPATIENT)
Dept: ELECTROPHYSIOLOGY | Facility: CLINIC | Age: 27
End: 2022-09-16

## 2022-09-16 VITALS
HEIGHT: 64 IN | TEMPERATURE: 97.7 F | HEART RATE: 56 BPM | OXYGEN SATURATION: 97 % | DIASTOLIC BLOOD PRESSURE: 74 MMHG | BODY MASS INDEX: 25.58 KG/M2 | SYSTOLIC BLOOD PRESSURE: 117 MMHG

## 2022-09-16 VITALS — BODY MASS INDEX: 25.58 KG/M2 | WEIGHT: 149 LBS

## 2022-09-16 DIAGNOSIS — I48.0 PAROXYSMAL ATRIAL FIBRILLATION: ICD-10-CM

## 2022-09-16 PROCEDURE — 93000 ELECTROCARDIOGRAM COMPLETE: CPT

## 2022-09-16 PROCEDURE — 99213 OFFICE O/P EST LOW 20 MIN: CPT | Mod: 25

## 2022-09-16 NOTE — CARDIOLOGY SUMMARY
[de-identified] : 5/11/2022: CONCLUSIONS:\par 1. Thickened mitral leaflets. THe posterior leaflet appears\par restricted in motion, there is mild prolapse of the\par anterior leaflet. There is systolic anterior motion of the\par mitral valve.  Moderate-severe mitral regurgitation.\par 2. Normal trileaflet aortic valve.\par 3. Severely dilated left atrium.  LA volume index = 57\par cc/m2.\par 4. Severe concentric left ventricular hypertrophy.\par 5. Hyperdynamic left ventricle. Peak left ventricular\par outflow tract gradient equals 34 mm Hg, consistent with\par mild LVOT obstruction.\par 6. Normal right ventricular size and function.

## 2022-09-16 NOTE — HISTORY OF PRESENT ILLNESS
[FreeTextEntry1] : Alexsander Moore is a 25y/o man with Hx of HCM, cardiofaciocutaneous syndrome, and paroxysmal afib s/p DCCV on 5/11/2022 who presents today for routine f/u. Has been doing well. Did have a bad cold/cough over the summer which has left him concerned. Thinks he could have some palpitations but unsure. Nothing like the afib he has had in the past. Denies chest pain, palpitations, SOB, syncope or near syncope.\par \par Of note, his genetic testing has been negative but he has all the phenotypic characteristics of obstructive hypertrophic cardiomyopathy. He also has a history of NSVT and remains on Atenolol. No family Hx of SCD/ICD. No Hx of syncope or prior arrest.

## 2022-09-16 NOTE — DISCUSSION/SUMMARY
[EKG obtained to assist in diagnosis and management of assessed problem(s)] : EKG obtained to assist in diagnosis and management of assessed problem(s) [FreeTextEntry1] : Impression:\par \par 1. Paroxysmal afib: s/p DCCV on 5/2022. EKG performed today to assess for presence of recurrent afib and reveals NSR. Remains on atenolol and Eliquis. No s/s of bleeding with Eliquis use. Consider 30 day CardioNet to assess for presence of recurrent tachyarrhythmias/afib (has Hx of NSVT on prior Holters). Unsure if wants to pursue CardioNet at this time. [place] going [place] and might consider more monitoring post travel. \par \par 2. HCM: Hx of HCM, ECHO with max LV thickness 20mm. Normal LVEF. No family Hx and no syncope. Consider cardiac MRI, will f/u with Dr. Marcos. \par \par Sincerely,\par \par Jonatan Vlilanueva MD

## 2024-06-25 ENCOUNTER — NON-APPOINTMENT (OUTPATIENT)
Age: 29
End: 2024-06-25

## 2024-06-25 ENCOUNTER — APPOINTMENT (OUTPATIENT)
Dept: ELECTROPHYSIOLOGY | Facility: CLINIC | Age: 29
End: 2024-06-25
Payer: COMMERCIAL

## 2024-06-25 VITALS
HEART RATE: 50 BPM | BODY MASS INDEX: 26.46 KG/M2 | SYSTOLIC BLOOD PRESSURE: 118 MMHG | DIASTOLIC BLOOD PRESSURE: 67 MMHG | HEIGHT: 64 IN | WEIGHT: 155 LBS

## 2024-06-25 DIAGNOSIS — R00.2 PALPITATIONS: ICD-10-CM

## 2024-06-25 DIAGNOSIS — I42.2 OTHER HYPERTROPHIC CARDIOMYOPATHY: ICD-10-CM

## 2024-06-25 DIAGNOSIS — I47.29 OTHER VENTRICULAR TACHYCARDIA: ICD-10-CM

## 2024-06-25 DIAGNOSIS — I48.91 UNSPECIFIED ATRIAL FIBRILLATION: ICD-10-CM

## 2024-06-25 PROCEDURE — 93000 ELECTROCARDIOGRAM COMPLETE: CPT

## 2024-06-25 PROCEDURE — 99213 OFFICE O/P EST LOW 20 MIN: CPT

## 2024-06-25 PROCEDURE — G2211 COMPLEX E/M VISIT ADD ON: CPT

## 2024-06-25 RX ORDER — APIXABAN 5 MG/1
5 TABLET, FILM COATED ORAL
Qty: 180 | Refills: 3 | Status: ACTIVE | COMMUNITY
Start: 2022-06-17 | End: 1900-01-01

## 2024-06-27 ENCOUNTER — RX RENEWAL (OUTPATIENT)
Age: 29
End: 2024-06-27

## 2024-08-02 ENCOUNTER — NON-APPOINTMENT (OUTPATIENT)
Age: 29
End: 2024-08-02

## 2024-08-02 ENCOUNTER — APPOINTMENT (OUTPATIENT)
Dept: ELECTROPHYSIOLOGY | Facility: CLINIC | Age: 29
End: 2024-08-02
Payer: COMMERCIAL

## 2024-08-02 VITALS
DIASTOLIC BLOOD PRESSURE: 67 MMHG | HEART RATE: 53 BPM | BODY MASS INDEX: 25.58 KG/M2 | HEIGHT: 65 IN | OXYGEN SATURATION: 99 % | SYSTOLIC BLOOD PRESSURE: 110 MMHG | WEIGHT: 153.5 LBS

## 2024-08-02 DIAGNOSIS — I48.0 PAROXYSMAL ATRIAL FIBRILLATION: ICD-10-CM

## 2024-08-02 DIAGNOSIS — I42.2 OTHER HYPERTROPHIC CARDIOMYOPATHY: ICD-10-CM

## 2024-08-02 DIAGNOSIS — I47.29 OTHER VENTRICULAR TACHYCARDIA: ICD-10-CM

## 2024-08-02 PROCEDURE — 93000 ELECTROCARDIOGRAM COMPLETE: CPT

## 2024-08-02 PROCEDURE — 99213 OFFICE O/P EST LOW 20 MIN: CPT

## 2024-08-02 PROCEDURE — G2211 COMPLEX E/M VISIT ADD ON: CPT

## 2024-08-02 NOTE — HISTORY OF PRESENT ILLNESS
[FreeTextEntry1] : Alexsander Moore is a 29 y/o man with Hx of HCM, cardiofaciocutaneous syndrome, and paroxysmal afib s/p DCCV on 5/11/2022 who presents today for routine f/u. Had recent few days of brief palpitations, nothing sustained and did not feel like afib. Denies chest pain, palpitations, SOB, syncope or near syncope. He is no longer following up with pediatric cardiologist. Has not undergone cardiac MRI as of yet. Wanted to get an ECG today to ensure sinus rhythm.   Of note, his genetic testing has been negative but he has all the phenotypic characteristics of obstructive hypertrophic cardiomyopathy. He also has a history of NSVT and remains on Atenolol. No family Hx of SCD/ICD. No Hx of syncope or prior arrest.

## 2024-08-02 NOTE — CARDIOLOGY SUMMARY
[de-identified] : 6/25/24 NSR 50, LVH [de-identified] : 5/11/2022: CONCLUSIONS:\par  1. Thickened mitral leaflets. THe posterior leaflet appears\par  restricted in motion, there is mild prolapse of the\par  anterior leaflet. There is systolic anterior motion of the\par  mitral valve.  Moderate-severe mitral regurgitation.\par  2. Normal trileaflet aortic valve.\par  3. Severely dilated left atrium.  LA volume index = 57\par  cc/m2.\par  4. Severe concentric left ventricular hypertrophy.\par  5. Hyperdynamic left ventricle. Peak left ventricular\par  outflow tract gradient equals 34 mm Hg, consistent with\par  mild LVOT obstruction.\par  6. Normal right ventricular size and function.

## 2024-08-02 NOTE — DISCUSSION/SUMMARY
[FreeTextEntry1] : Impression:  1. Paroxysmal afib: s/p DCCV on 5/2022. EKG performed today to assess for presence of recurrent afib and reveals NSR. Remains on atenolol and Eliquis. No s/s of bleeding with Eliquis use. Discussed following up with adult congenital heart cardiologist due to symptoms of dyspnea with minimal exertion. Patient to see HCM expert at [facility], Kathrine Haynes (did not go). Had brief palpitations but denies feeling like afib. Offered Holter but denied at this time.   2. HCM: Hx of HCM, ECHO with max LV thickness 20mm. Normal LVEF. No family Hx and no syncope. Consider cardiac MRI, will f/u with Dr. Marcos.    Will continue f/u with Cardiologist and may RTO 6 months and as needed or if any new or worsening symptoms or findings occur. [EKG obtained to assist in diagnosis and management of assessed problem(s)] : EKG obtained to assist in diagnosis and management of assessed problem(s)

## 2024-09-28 ENCOUNTER — NON-APPOINTMENT (OUTPATIENT)
Age: 29
End: 2024-09-28

## 2025-03-11 ENCOUNTER — RX RENEWAL (OUTPATIENT)
Age: 30
End: 2025-03-11